# Patient Record
Sex: MALE | Race: WHITE | NOT HISPANIC OR LATINO | Employment: OTHER | URBAN - METROPOLITAN AREA
[De-identification: names, ages, dates, MRNs, and addresses within clinical notes are randomized per-mention and may not be internally consistent; named-entity substitution may affect disease eponyms.]

---

## 2017-11-20 ENCOUNTER — NON-PROVIDER VISIT (OUTPATIENT)
Dept: OCCUPATIONAL MEDICINE | Facility: CLINIC | Age: 72
End: 2017-11-20

## 2017-11-20 DIAGNOSIS — Z11.1 PPD SCREENING TEST: ICD-10-CM

## 2017-11-20 PROCEDURE — 86580 TB INTRADERMAL TEST: CPT | Performed by: PREVENTIVE MEDICINE

## 2017-11-22 ENCOUNTER — NON-PROVIDER VISIT (OUTPATIENT)
Dept: OCCUPATIONAL MEDICINE | Facility: CLINIC | Age: 72
End: 2017-11-22

## 2017-11-22 DIAGNOSIS — Z11.1 ENCOUNTER FOR PPD SKIN TEST READING: ICD-10-CM

## 2017-11-22 LAB — TB WHEAL 3D P 5 TU DIAM: NORMAL MM

## 2018-02-04 ENCOUNTER — APPOINTMENT (OUTPATIENT)
Dept: RADIOLOGY | Facility: MEDICAL CENTER | Age: 73
DRG: 312 | End: 2018-02-04
Attending: EMERGENCY MEDICINE
Payer: MEDICARE

## 2018-02-04 ENCOUNTER — HOSPITAL ENCOUNTER (INPATIENT)
Facility: MEDICAL CENTER | Age: 73
LOS: 4 days | DRG: 312 | End: 2018-02-08
Attending: EMERGENCY MEDICINE | Admitting: INTERNAL MEDICINE
Payer: MEDICARE

## 2018-02-04 ENCOUNTER — RESOLUTE PROFESSIONAL BILLING HOSPITAL PROF FEE (OUTPATIENT)
Dept: MEDSURG UNIT | Facility: MEDICAL CENTER | Age: 73
End: 2018-02-04
Payer: MEDICARE

## 2018-02-04 ENCOUNTER — APPOINTMENT (OUTPATIENT)
Dept: RADIOLOGY | Facility: MEDICAL CENTER | Age: 73
DRG: 312 | End: 2018-02-04
Attending: INTERNAL MEDICINE
Payer: MEDICARE

## 2018-02-04 DIAGNOSIS — E87.1 HYPONATREMIA: ICD-10-CM

## 2018-02-04 DIAGNOSIS — T14.8XXA MULTIPLE SKIN TEARS: ICD-10-CM

## 2018-02-04 DIAGNOSIS — S22.31XA CLOSED FRACTURE OF ONE RIB OF RIGHT SIDE, INITIAL ENCOUNTER: ICD-10-CM
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
## 2018-02-04 DIAGNOSIS — I95.1 ORTHOSTATIC SYNCOPE: ICD-10-CM

## 2018-02-04 PROBLEM — R91.8 LUNG MASS: Status: ACTIVE | Noted: 2018-02-04

## 2018-02-04 PROBLEM — D72.829 LEUKOCYTOSIS: Status: ACTIVE | Noted: 2018-02-04

## 2018-02-04 PROBLEM — R07.9 CHEST PAIN: Status: ACTIVE | Noted: 2018-02-04

## 2018-02-04 LAB
ALBUMIN SERPL BCP-MCNC: 3.1 G/DL (ref 3.2–4.9)
ALBUMIN/GLOB SERPL: 1.5 G/DL
ALP SERPL-CCNC: 68 U/L (ref 30–99)
ALT SERPL-CCNC: 27 U/L (ref 2–50)
AMPHET UR QL SCN: NEGATIVE
ANION GAP SERPL CALC-SCNC: 5 MMOL/L (ref 0–11.9)
APPEARANCE UR: CLEAR
APTT PPP: 28.1 SEC (ref 24.7–36)
AST SERPL-CCNC: 36 U/L (ref 12–45)
BARBITURATES UR QL SCN: NEGATIVE
BASOPHILS # BLD AUTO: 0 % (ref 0–1.8)
BASOPHILS # BLD: 0 K/UL (ref 0–0.12)
BENZODIAZ UR QL SCN: NEGATIVE
BILIRUB SERPL-MCNC: 0.5 MG/DL (ref 0.1–1.5)
BILIRUB UR QL STRIP.AUTO: NEGATIVE
BUN SERPL-MCNC: 7 MG/DL (ref 8–22)
BZE UR QL SCN: NEGATIVE
CALCIUM SERPL-MCNC: 8 MG/DL (ref 8.5–10.5)
CANNABINOIDS UR QL SCN: NEGATIVE
CHLORIDE SERPL-SCNC: 99 MMOL/L (ref 96–112)
CK SERPL-CCNC: 90 U/L (ref 0–154)
CO2 SERPL-SCNC: 23 MMOL/L (ref 20–33)
COLOR UR: YELLOW
CREAT SERPL-MCNC: 0.72 MG/DL (ref 0.5–1.4)
CREAT UR-MCNC: 32.8 MG/DL
EKG IMPRESSION: NORMAL
EOSINOPHIL # BLD AUTO: 0 K/UL (ref 0–0.51)
EOSINOPHIL NFR BLD: 0 % (ref 0–6.9)
ERYTHROCYTE [DISTWIDTH] IN BLOOD BY AUTOMATED COUNT: 43.6 FL (ref 35.9–50)
ETHANOL BLD-MCNC: 0.01 G/DL
GLOBULIN SER CALC-MCNC: 2.1 G/DL (ref 1.9–3.5)
GLUCOSE SERPL-MCNC: 82 MG/DL (ref 65–99)
GLUCOSE UR STRIP.AUTO-MCNC: NEGATIVE MG/DL
HCT VFR BLD AUTO: 36.5 % (ref 42–52)
HGB BLD-MCNC: 12.4 G/DL (ref 14–18)
INR PPP: 1.12 (ref 0.87–1.13)
KETONES UR STRIP.AUTO-MCNC: NEGATIVE MG/DL
LEUKOCYTE ESTERASE UR QL STRIP.AUTO: NEGATIVE
LYMPHOCYTES # BLD AUTO: 4.87 K/UL (ref 1–4.8)
LYMPHOCYTES NFR BLD: 31.6 % (ref 22–41)
MANUAL DIFF BLD: NORMAL
MCH RBC QN AUTO: 32.2 PG (ref 27–33)
MCHC RBC AUTO-ENTMCNC: 34 G/DL (ref 33.7–35.3)
MCV RBC AUTO: 94.8 FL (ref 81.4–97.8)
METHADONE UR QL SCN: NEGATIVE
MICRO URNS: NORMAL
MONOCYTES # BLD AUTO: 0.54 K/UL (ref 0–0.85)
MONOCYTES NFR BLD AUTO: 3.5 % (ref 0–13.4)
MORPHOLOGY BLD-IMP: NORMAL
NEUTROPHILS # BLD AUTO: 9.99 K/UL (ref 1.82–7.42)
NEUTROPHILS NFR BLD: 64 % (ref 44–72)
NEUTS BAND NFR BLD MANUAL: 0.9 % (ref 0–10)
NITRITE UR QL STRIP.AUTO: NEGATIVE
NRBC # BLD AUTO: 0 K/UL
NRBC BLD-RTO: 0 /100 WBC
OPIATES UR QL SCN: NEGATIVE
OXYCODONE UR QL SCN: POSITIVE
PCP UR QL SCN: NEGATIVE
PH UR STRIP.AUTO: 6.5 [PH]
PLATELET # BLD AUTO: 213 K/UL (ref 164–446)
PLATELET BLD QL SMEAR: NORMAL
PMV BLD AUTO: 8.9 FL (ref 9–12.9)
POTASSIUM SERPL-SCNC: 3.7 MMOL/L (ref 3.6–5.5)
POTASSIUM UR-SCNC: 6.3 MMOL/L
PROPOXYPH UR QL SCN: NEGATIVE
PROT SERPL-MCNC: 5.2 G/DL (ref 6–8.2)
PROT UR QL STRIP: NEGATIVE MG/DL
PROTHROMBIN TIME: 14.1 SEC (ref 12–14.6)
RBC # BLD AUTO: 3.85 M/UL (ref 4.7–6.1)
RBC BLD AUTO: PRESENT
RBC UR QL AUTO: NEGATIVE
SMUDGE CELLS BLD QL SMEAR: NORMAL
SODIUM SERPL-SCNC: 127 MMOL/L (ref 135–145)
SODIUM UR-SCNC: 23 MMOL/L
SP GR UR STRIP.AUTO: 1
TROPONIN I SERPL-MCNC: <0.01 NG/ML (ref 0–0.04)
TSH SERPL DL<=0.005 MIU/L-ACNC: 1.45 UIU/ML (ref 0.38–5.33)
UROBILINOGEN UR STRIP.AUTO-MCNC: 0.2 MG/DL
WBC # BLD AUTO: 15.4 K/UL (ref 4.8–10.8)

## 2018-02-04 PROCEDURE — 84133 ASSAY OF URINE POTASSIUM: CPT

## 2018-02-04 PROCEDURE — 85027 COMPLETE CBC AUTOMATED: CPT

## 2018-02-04 PROCEDURE — 93005 ELECTROCARDIOGRAM TRACING: CPT

## 2018-02-04 PROCEDURE — 94760 N-INVAS EAR/PLS OXIMETRY 1: CPT

## 2018-02-04 PROCEDURE — A9270 NON-COVERED ITEM OR SERVICE: HCPCS | Performed by: STUDENT IN AN ORGANIZED HEALTH CARE EDUCATION/TRAINING PROGRAM

## 2018-02-04 PROCEDURE — 96360 HYDRATION IV INFUSION INIT: CPT

## 2018-02-04 PROCEDURE — 85610 PROTHROMBIN TIME: CPT

## 2018-02-04 PROCEDURE — 82550 ASSAY OF CK (CPK): CPT

## 2018-02-04 PROCEDURE — 81003 URINALYSIS AUTO W/O SCOPE: CPT

## 2018-02-04 PROCEDURE — 93005 ELECTROCARDIOGRAM TRACING: CPT | Performed by: EMERGENCY MEDICINE

## 2018-02-04 PROCEDURE — 304217 HCHG IRRIGATION SYSTEM

## 2018-02-04 PROCEDURE — 85007 BL SMEAR W/DIFF WBC COUNT: CPT

## 2018-02-04 PROCEDURE — 85730 THROMBOPLASTIN TIME PARTIAL: CPT

## 2018-02-04 PROCEDURE — 82570 ASSAY OF URINE CREATININE: CPT

## 2018-02-04 PROCEDURE — 500002 HCHG ADHESIVE, DERMABOND: Performed by: EMERGENCY MEDICINE

## 2018-02-04 PROCEDURE — 83036 HEMOGLOBIN GLYCOSYLATED A1C: CPT

## 2018-02-04 PROCEDURE — 700105 HCHG RX REV CODE 258: Performed by: EMERGENCY MEDICINE

## 2018-02-04 PROCEDURE — 71045 X-RAY EXAM CHEST 1 VIEW: CPT

## 2018-02-04 PROCEDURE — 70450 CT HEAD/BRAIN W/O DYE: CPT

## 2018-02-04 PROCEDURE — 84484 ASSAY OF TROPONIN QUANT: CPT

## 2018-02-04 PROCEDURE — 80307 DRUG TEST PRSMV CHEM ANLYZR: CPT

## 2018-02-04 PROCEDURE — 700105 HCHG RX REV CODE 258: Performed by: STUDENT IN AN ORGANIZED HEALTH CARE EDUCATION/TRAINING PROGRAM

## 2018-02-04 PROCEDURE — 84443 ASSAY THYROID STIM HORMONE: CPT

## 2018-02-04 PROCEDURE — 80053 COMPREHEN METABOLIC PANEL: CPT

## 2018-02-04 PROCEDURE — 84300 ASSAY OF URINE SODIUM: CPT

## 2018-02-04 PROCEDURE — 700102 HCHG RX REV CODE 250 W/ 637 OVERRIDE(OP): Performed by: STUDENT IN AN ORGANIZED HEALTH CARE EDUCATION/TRAINING PROGRAM

## 2018-02-04 PROCEDURE — 770020 HCHG ROOM/CARE - TELE (206)

## 2018-02-04 PROCEDURE — 99285 EMERGENCY DEPT VISIT HI MDM: CPT

## 2018-02-04 PROCEDURE — 36415 COLL VENOUS BLD VENIPUNCTURE: CPT

## 2018-02-04 PROCEDURE — 71101 X-RAY EXAM UNILAT RIBS/CHEST: CPT | Mod: RT

## 2018-02-04 RX ORDER — DONEPEZIL HYDROCHLORIDE 10 MG/1
10 TABLET, FILM COATED ORAL NIGHTLY
Status: ON HOLD | COMMUNITY
End: 2018-02-07

## 2018-02-04 RX ORDER — OXYCODONE AND ACETAMINOPHEN 10; 325 MG/1; MG/1
1 TABLET ORAL 2 TIMES DAILY
COMMUNITY
End: 2021-07-22 | Stop reason: SDUPTHER

## 2018-02-04 RX ORDER — PAROXETINE HYDROCHLORIDE 20 MG/1
20 TABLET, FILM COATED ORAL DAILY
COMMUNITY
End: 2022-01-17

## 2018-02-04 RX ORDER — ONDANSETRON 4 MG/1
4 TABLET, ORALLY DISINTEGRATING ORAL EVERY 4 HOURS PRN
Status: DISCONTINUED | OUTPATIENT
Start: 2018-02-04 | End: 2018-02-08 | Stop reason: HOSPADM

## 2018-02-04 RX ORDER — BISACODYL 10 MG
10 SUPPOSITORY, RECTAL RECTAL
Status: DISCONTINUED | OUTPATIENT
Start: 2018-02-04 | End: 2018-02-08 | Stop reason: HOSPADM

## 2018-02-04 RX ORDER — TIOTROPIUM BROMIDE 18 UG/1
1 CAPSULE ORAL; RESPIRATORY (INHALATION) DAILY
Status: DISCONTINUED | OUTPATIENT
Start: 2018-02-05 | End: 2018-02-08 | Stop reason: HOSPADM

## 2018-02-04 RX ORDER — POLYETHYLENE GLYCOL 3350 17 G/17G
1 POWDER, FOR SOLUTION ORAL
Status: DISCONTINUED | OUTPATIENT
Start: 2018-02-04 | End: 2018-02-08 | Stop reason: HOSPADM

## 2018-02-04 RX ORDER — ONDANSETRON 2 MG/ML
4 INJECTION INTRAMUSCULAR; INTRAVENOUS EVERY 4 HOURS PRN
Status: DISCONTINUED | OUTPATIENT
Start: 2018-02-04 | End: 2018-02-08 | Stop reason: HOSPADM

## 2018-02-04 RX ORDER — ACETAMINOPHEN 325 MG/1
650 TABLET ORAL EVERY 6 HOURS PRN
Status: DISCONTINUED | OUTPATIENT
Start: 2018-02-04 | End: 2018-02-08 | Stop reason: HOSPADM

## 2018-02-04 RX ORDER — AMOXICILLIN 250 MG
2 CAPSULE ORAL 2 TIMES DAILY
Status: DISCONTINUED | OUTPATIENT
Start: 2018-02-04 | End: 2018-02-08 | Stop reason: HOSPADM

## 2018-02-04 RX ORDER — NICOTINE 21 MG/24HR
21 PATCH, TRANSDERMAL 24 HOURS TRANSDERMAL
Status: DISCONTINUED | OUTPATIENT
Start: 2018-02-05 | End: 2018-02-08 | Stop reason: HOSPADM

## 2018-02-04 RX ORDER — DONEPEZIL HYDROCHLORIDE 5 MG/1
10 TABLET, FILM COATED ORAL NIGHTLY
Status: DISCONTINUED | OUTPATIENT
Start: 2018-02-04 | End: 2018-02-06

## 2018-02-04 RX ORDER — LABETALOL HYDROCHLORIDE 5 MG/ML
10 INJECTION, SOLUTION INTRAVENOUS EVERY 4 HOURS PRN
Status: DISCONTINUED | OUTPATIENT
Start: 2018-02-04 | End: 2018-02-08 | Stop reason: HOSPADM

## 2018-02-04 RX ORDER — SODIUM CHLORIDE 9 MG/ML
INJECTION, SOLUTION INTRAVENOUS CONTINUOUS
Status: DISCONTINUED | OUTPATIENT
Start: 2018-02-04 | End: 2018-02-07

## 2018-02-04 RX ORDER — DOCUSATE SODIUM 100 MG/1
200 CAPSULE, LIQUID FILLED ORAL DAILY
COMMUNITY
End: 2022-01-17

## 2018-02-04 RX ORDER — SODIUM CHLORIDE 9 MG/ML
1000 INJECTION, SOLUTION INTRAVENOUS ONCE
Status: COMPLETED | OUTPATIENT
Start: 2018-02-04 | End: 2018-02-04

## 2018-02-04 RX ORDER — ERYTHROMYCIN 5 MG/G
OINTMENT OPHTHALMIC NIGHTLY
COMMUNITY
End: 2022-02-23

## 2018-02-04 RX ADMIN — SODIUM CHLORIDE: 9 INJECTION, SOLUTION INTRAVENOUS at 23:53

## 2018-02-04 RX ADMIN — DONEPEZIL HYDROCHLORIDE 10 MG: 5 TABLET, FILM COATED ORAL at 23:52

## 2018-02-04 RX ADMIN — SODIUM CHLORIDE 1000 ML: 9 INJECTION, SOLUTION INTRAVENOUS at 20:28

## 2018-02-04 ASSESSMENT — ENCOUNTER SYMPTOMS
CLAUDICATION: 0
CHILLS: 0
TREMORS: 0
EYE DISCHARGE: 0
EYE REDNESS: 0
HEARTBURN: 0
BLOOD IN STOOL: 0
BLURRED VISION: 0
DEPRESSION: 0
EYE PAIN: 0
HALLUCINATIONS: 0
SINUS PAIN: 0
STRIDOR: 0
HEADACHES: 0
ORTHOPNEA: 0
DIAPHORESIS: 0
COUGH: 1
DIARRHEA: 0
DIZZINESS: 1
CONSTIPATION: 0
SPUTUM PRODUCTION: 0
HEMOPTYSIS: 0
FLANK PAIN: 0
BACK PAIN: 0
ABDOMINAL PAIN: 0
DOUBLE VISION: 0
WEIGHT LOSS: 0
VOMITING: 0
INSOMNIA: 0
FOCAL WEAKNESS: 0
SEIZURES: 0
PHOTOPHOBIA: 0
WHEEZING: 0
NAUSEA: 0
TINGLING: 0
NECK PAIN: 0
NERVOUS/ANXIOUS: 0
PND: 0
PALPITATIONS: 0
FALLS: 1
SORE THROAT: 0
MYALGIAS: 0
FEVER: 0
LOSS OF CONSCIOUSNESS: 0
SHORTNESS OF BREATH: 0
SPEECH CHANGE: 0
BRUISES/BLEEDS EASILY: 0
SENSORY CHANGE: 0
MEMORY LOSS: 0
POLYDIPSIA: 0
WEAKNESS: 0

## 2018-02-04 ASSESSMENT — LIFESTYLE VARIABLES
AVERAGE NUMBER OF DAYS PER WEEK YOU HAVE A DRINK CONTAINING ALCOHOL: 0
EVER_SMOKED: YES
EVER_SMOKED: YES
EVER FELT BAD OR GUILTY ABOUT YOUR DRINKING: NO
TOTAL SCORE: 0
EVER HAD A DRINK FIRST THING IN THE MORNING TO STEADY YOUR NERVES TO GET RID OF A HANGOVER: NO
TOTAL SCORE: 0
ON A TYPICAL DAY WHEN YOU DRINK ALCOHOL HOW MANY DRINKS DO YOU HAVE: 0
HAVE YOU EVER FELT YOU SHOULD CUT DOWN ON YOUR DRINKING: NO
CONSUMPTION TOTAL: NEGATIVE
HAVE PEOPLE ANNOYED YOU BY CRITICIZING YOUR DRINKING: NO
TOTAL SCORE: 0
SUBSTANCE_ABUSE: 0
ALCOHOL_USE: YES
HOW MANY TIMES IN THE PAST YEAR HAVE YOU HAD 5 OR MORE DRINKS IN A DAY: 0

## 2018-02-04 ASSESSMENT — COGNITIVE AND FUNCTIONAL STATUS - GENERAL
DRESSING REGULAR UPPER BODY CLOTHING: A LITTLE
CLIMB 3 TO 5 STEPS WITH RAILING: A LITTLE
SUGGESTED CMS G CODE MODIFIER MOBILITY: CJ
MOVING FROM LYING ON BACK TO SITTING ON SIDE OF FLAT BED: A LITTLE
DRESSING REGULAR LOWER BODY CLOTHING: A LITTLE
MOBILITY SCORE: 20
HELP NEEDED FOR BATHING: A LITTLE
SUGGESTED CMS G CODE MODIFIER DAILY ACTIVITY: CJ
DAILY ACTIVITIY SCORE: 21
WALKING IN HOSPITAL ROOM: A LITTLE
STANDING UP FROM CHAIR USING ARMS: A LITTLE

## 2018-02-04 ASSESSMENT — PAIN SCALES - GENERAL
PAINLEVEL_OUTOF10: 3
PAINLEVEL_OUTOF10: 7

## 2018-02-04 ASSESSMENT — PATIENT HEALTH QUESTIONNAIRE - PHQ9
SUM OF ALL RESPONSES TO PHQ9 QUESTIONS 1 AND 2: 0
SUM OF ALL RESPONSES TO PHQ QUESTIONS 1-9: 0
2. FEELING DOWN, DEPRESSED, IRRITABLE, OR HOPELESS: NOT AT ALL
1. LITTLE INTEREST OR PLEASURE IN DOING THINGS: NOT AT ALL

## 2018-02-05 PROBLEM — R00.1 BRADYCARDIA: Status: ACTIVE | Noted: 2018-02-05

## 2018-02-05 PROBLEM — Z86.718 HISTORY OF DEEP VEIN THROMBOSIS (DVT) OF LOWER EXTREMITY: Status: ACTIVE | Noted: 2018-02-05

## 2018-02-05 LAB
ALBUMIN SERPL BCP-MCNC: 3.3 G/DL (ref 3.2–4.9)
ALBUMIN/GLOB SERPL: 2.1 G/DL
ALP SERPL-CCNC: 60 U/L (ref 30–99)
ALT SERPL-CCNC: 21 U/L (ref 2–50)
ANION GAP SERPL CALC-SCNC: 4 MMOL/L (ref 0–11.9)
AST SERPL-CCNC: 27 U/L (ref 12–45)
BASOPHILS # BLD AUTO: 0 % (ref 0–1.8)
BASOPHILS # BLD: 0 K/UL (ref 0–0.12)
BILIRUB SERPL-MCNC: 0.5 MG/DL (ref 0.1–1.5)
BUN SERPL-MCNC: 7 MG/DL (ref 8–22)
CALCIUM SERPL-MCNC: 8.3 MG/DL (ref 8.5–10.5)
CHLORIDE SERPL-SCNC: 108 MMOL/L (ref 96–112)
CO2 SERPL-SCNC: 22 MMOL/L (ref 20–33)
CREAT SERPL-MCNC: 0.56 MG/DL (ref 0.5–1.4)
DEPRECATED D DIMER PPP IA-ACNC: 567 NG/ML(D-DU)
EOSINOPHIL # BLD AUTO: 0 K/UL (ref 0–0.51)
EOSINOPHIL NFR BLD: 0 % (ref 0–6.9)
ERYTHROCYTE [DISTWIDTH] IN BLOOD BY AUTOMATED COUNT: 43.8 FL (ref 35.9–50)
EST. AVERAGE GLUCOSE BLD GHB EST-MCNC: 117 MG/DL
GLOBULIN SER CALC-MCNC: 1.6 G/DL (ref 1.9–3.5)
GLUCOSE SERPL-MCNC: 100 MG/DL (ref 65–99)
HBA1C MFR BLD: 5.7 % (ref 0–5.6)
HCT VFR BLD AUTO: 36 % (ref 42–52)
HGB BLD-MCNC: 12.1 G/DL (ref 14–18)
LV EJECT FRACT  99904: 55
LV EJECT FRACT MOD 2C 99903: 75.9
LV EJECT FRACT MOD 4C 99902: 60.68
LV EJECT FRACT MOD BP 99901: 69.83
LYMPHOCYTES # BLD AUTO: 7.01 K/UL (ref 1–4.8)
LYMPHOCYTES NFR BLD: 56.5 % (ref 22–41)
MANUAL DIFF BLD: NORMAL
MCH RBC QN AUTO: 31.9 PG (ref 27–33)
MCHC RBC AUTO-ENTMCNC: 33.6 G/DL (ref 33.7–35.3)
MCV RBC AUTO: 95 FL (ref 81.4–97.8)
MONOCYTES # BLD AUTO: 0.32 K/UL (ref 0–0.85)
MONOCYTES NFR BLD AUTO: 2.6 % (ref 0–13.4)
MORPHOLOGY BLD-IMP: NORMAL
NEUTROPHILS # BLD AUTO: 5.07 K/UL (ref 1.82–7.42)
NEUTROPHILS NFR BLD: 39.1 % (ref 44–72)
NEUTS BAND NFR BLD MANUAL: 1.8 % (ref 0–10)
NRBC # BLD AUTO: 0.03 K/UL
NRBC BLD-RTO: 0.2 /100 WBC
PLATELET # BLD AUTO: 216 K/UL (ref 164–446)
PLATELET BLD QL SMEAR: NORMAL
PMV BLD AUTO: 9 FL (ref 9–12.9)
POTASSIUM SERPL-SCNC: 3.9 MMOL/L (ref 3.6–5.5)
PROCALCITONIN SERPL-MCNC: <0.05 NG/ML
PROT SERPL-MCNC: 4.9 G/DL (ref 6–8.2)
RBC # BLD AUTO: 3.79 M/UL (ref 4.7–6.1)
RBC BLD AUTO: PRESENT
SMUDGE CELLS BLD QL SMEAR: NORMAL
SODIUM SERPL-SCNC: 134 MMOL/L (ref 135–145)
WBC # BLD AUTO: 12.4 K/UL (ref 4.8–10.8)

## 2018-02-05 PROCEDURE — 93306 TTE W/DOPPLER COMPLETE: CPT | Mod: 26 | Performed by: INTERNAL MEDICINE

## 2018-02-05 PROCEDURE — 85007 BL SMEAR W/DIFF WBC COUNT: CPT

## 2018-02-05 PROCEDURE — 700101 HCHG RX REV CODE 250: Performed by: STUDENT IN AN ORGANIZED HEALTH CARE EDUCATION/TRAINING PROGRAM

## 2018-02-05 PROCEDURE — 99223 1ST HOSP IP/OBS HIGH 75: CPT | Mod: AI,GC | Performed by: INTERNAL MEDICINE

## 2018-02-05 PROCEDURE — 700111 HCHG RX REV CODE 636 W/ 250 OVERRIDE (IP): Performed by: STUDENT IN AN ORGANIZED HEALTH CARE EDUCATION/TRAINING PROGRAM

## 2018-02-05 PROCEDURE — 93306 TTE W/DOPPLER COMPLETE: CPT

## 2018-02-05 PROCEDURE — 84145 PROCALCITONIN (PCT): CPT

## 2018-02-05 PROCEDURE — 80053 COMPREHEN METABOLIC PANEL: CPT

## 2018-02-05 PROCEDURE — 770020 HCHG ROOM/CARE - TELE (206)

## 2018-02-05 PROCEDURE — 85379 FIBRIN DEGRADATION QUANT: CPT

## 2018-02-05 PROCEDURE — 85027 COMPLETE CBC AUTOMATED: CPT

## 2018-02-05 PROCEDURE — 700105 HCHG RX REV CODE 258: Performed by: STUDENT IN AN ORGANIZED HEALTH CARE EDUCATION/TRAINING PROGRAM

## 2018-02-05 PROCEDURE — A9270 NON-COVERED ITEM OR SERVICE: HCPCS | Performed by: STUDENT IN AN ORGANIZED HEALTH CARE EDUCATION/TRAINING PROGRAM

## 2018-02-05 PROCEDURE — 700102 HCHG RX REV CODE 250 W/ 637 OVERRIDE(OP): Performed by: STUDENT IN AN ORGANIZED HEALTH CARE EDUCATION/TRAINING PROGRAM

## 2018-02-05 PROCEDURE — 99222 1ST HOSP IP/OBS MODERATE 55: CPT | Performed by: INTERNAL MEDICINE

## 2018-02-05 PROCEDURE — 99406 BEHAV CHNG SMOKING 3-10 MIN: CPT

## 2018-02-05 PROCEDURE — 36415 COLL VENOUS BLD VENIPUNCTURE: CPT

## 2018-02-05 RX ORDER — TRAMADOL HYDROCHLORIDE 50 MG/1
50 TABLET ORAL ONCE
Status: COMPLETED | OUTPATIENT
Start: 2018-02-05 | End: 2018-02-05

## 2018-02-05 RX ADMIN — ACETAMINOPHEN 650 MG: 325 TABLET, FILM COATED ORAL at 06:45

## 2018-02-05 RX ADMIN — TIOTROPIUM BROMIDE 1 CAPSULE: 18 CAPSULE ORAL; RESPIRATORY (INHALATION) at 08:32

## 2018-02-05 RX ADMIN — SODIUM CHLORIDE: 9 INJECTION, SOLUTION INTRAVENOUS at 09:40

## 2018-02-05 RX ADMIN — DONEPEZIL HYDROCHLORIDE 10 MG: 5 TABLET, FILM COATED ORAL at 19:52

## 2018-02-05 RX ADMIN — ACETAMINOPHEN 650 MG: 325 TABLET, FILM COATED ORAL at 00:14

## 2018-02-05 RX ADMIN — FOLIC ACID: 5 INJECTION, SOLUTION INTRAMUSCULAR; INTRAVENOUS; SUBCUTANEOUS at 02:40

## 2018-02-05 RX ADMIN — ENOXAPARIN SODIUM 40 MG: 100 INJECTION SUBCUTANEOUS at 08:32

## 2018-02-05 RX ADMIN — TRAMADOL HYDROCHLORIDE 50 MG: 50 TABLET, COATED ORAL at 19:52

## 2018-02-05 RX ADMIN — NICOTINE 21 MG: 21 PATCH, EXTENDED RELEASE TRANSDERMAL at 04:59

## 2018-02-05 RX ADMIN — ACETAMINOPHEN 650 MG: 325 TABLET, FILM COATED ORAL at 23:21

## 2018-02-05 RX ADMIN — ACETAMINOPHEN 650 MG: 325 TABLET, FILM COATED ORAL at 16:34

## 2018-02-05 RX ADMIN — SODIUM CHLORIDE: 9 INJECTION, SOLUTION INTRAVENOUS at 19:31

## 2018-02-05 RX ADMIN — STANDARDIZED SENNA CONCENTRATE AND DOCUSATE SODIUM 2 TABLET: 8.6; 5 TABLET, FILM COATED ORAL at 19:52

## 2018-02-05 ASSESSMENT — PAIN SCALES - GENERAL
PAINLEVEL_OUTOF10: 10
PAINLEVEL_OUTOF10: 5
PAINLEVEL_OUTOF10: 8
PAINLEVEL_OUTOF10: 2
PAINLEVEL_OUTOF10: 0
PAINLEVEL_OUTOF10: 5
PAINLEVEL_OUTOF10: 3
PAINLEVEL_OUTOF10: 9
PAINLEVEL_OUTOF10: 0

## 2018-02-05 ASSESSMENT — ENCOUNTER SYMPTOMS
BLOOD IN STOOL: 0
DIARRHEA: 0
POLYDIPSIA: 0
ORTHOPNEA: 0
PALPITATIONS: 0
COUGH: 0
BLURRED VISION: 0
CONSTIPATION: 0
VOMITING: 0
FEVER: 0
DOUBLE VISION: 0
WHEEZING: 0
WEAKNESS: 1
FLANK PAIN: 0
SENSORY CHANGE: 0
FOCAL WEAKNESS: 0
NERVOUS/ANXIOUS: 0
SHORTNESS OF BREATH: 0
CHILLS: 0
HEARTBURN: 0
LOSS OF CONSCIOUSNESS: 0
HEADACHES: 0
SPEECH CHANGE: 0
ABDOMINAL PAIN: 0
NAUSEA: 0
DEPRESSION: 0
SORE THROAT: 0
SPUTUM PRODUCTION: 0
MYALGIAS: 0

## 2018-02-05 NOTE — PROGRESS NOTES
Internal Medicine Interval Note  Note Author: ZAINAB Hong.     Name Jose Armando Wu     1945   Age/Sex 72 y.o. male   MRN [id number]   Code Status FULL     After 5PM or if no immediate response to page, please call for cross-coverage  Attending/Team:  See Patient List for primary contact information  Call [phone] to page    1st Call - Day Intern (R1):   Sarah Vazquez   2nd Call - Day Sr. Resident (R2/R3):             Reason for interval visit  (Principal Problem)   Fall from ground level    Interval Problem Daily Status Update  (24 hours)   -Patient is admitted last night after a Ground level fall with no prodromal symptoms.  -Patient has ongoing symptoms of chest wall pain right sided since the fall.  -ECHO is pending.  -On telemetry monitoring.  -Denies Dizziness at rest, has Symptoms of lightheadedness on standing from change in the posture.    Review of Systems   Constitutional: Negative for chills and fever.   HENT: Negative for ear discharge, nosebleeds and sore throat.    Eyes: Negative for blurred vision and double vision.   Respiratory: Negative for cough, sputum production, shortness of breath and wheezing.    Cardiovascular: Positive for chest pain. Negative for palpitations, orthopnea and leg swelling.   Gastrointestinal: Negative for abdominal pain, blood in stool, constipation, diarrhea, heartburn, nausea and vomiting.   Genitourinary: Negative for dysuria, flank pain, frequency, hematuria and urgency.   Musculoskeletal: Positive for joint pain. Negative for myalgias.   Skin: Negative for rash.   Neurological: Positive for weakness. Negative for sensory change, speech change, focal weakness, loss of consciousness and headaches.   Endo/Heme/Allergies: Negative for polydipsia.   Psychiatric/Behavioral: Negative for depression. The patient is not nervous/anxious.        Consultants/Specialty  geriatrics    Disposition  Inpatient management for further  evaluation, Plan to discharge tomorrow     Quality Measures    Reviewed items::  EKG reviewed, Radiology images reviewed, Labs reviewed and Medications reviewed  Skelton catheter::  No Skelton  DVT prophylaxis pharmacological::  Enoxaparin (Lovenox)  Ulcer Prophylaxis::  Not indicated      Physical Exam       Vitals:    02/05/18 0333 02/05/18 0752 02/05/18 1136 02/05/18 1513   BP: (!) 98/69 110/74 103/70 116/77   Pulse: 66 82 80 93   Resp: 18 20 18 18   Temp: 36.9 °C (98.4 °F) 36.9 °C (98.5 °F) 37.1 °C (98.7 °F) 37.2 °C (99 °F)   SpO2: 94% 97% 95% 94%   Weight:       Height:         Body mass index is 16.86 kg/m². Weight: 56.4 kg (124 lb 5.4 oz)  Oxygen Therapy:  Pulse Oximetry: 94 %, O2 (LPM): 0, O2 Delivery: None (Room Air)    Physical Exam   Constitutional: He appears malnourished. No distress.   HENT:   Mouth/Throat: Oropharynx is clear and moist.   Eyes: Conjunctivae and EOM are normal. Pupils are equal, round, and reactive to light. No scleral icterus.   Neck: Normal range of motion. Neck supple. No JVD present.   Cardiovascular: Normal rate and regular rhythm.    Murmur heard.  Pulmonary/Chest: Effort normal and breath sounds normal. No respiratory distress. He has no wheezes. He has no rales. He exhibits tenderness.   Abdominal: Soft. Bowel sounds are normal. He exhibits no distension. There is no tenderness. There is no rebound and no guarding.   Musculoskeletal: Normal range of motion. He exhibits no edema.   Neurological: He is alert. He has normal sensation and normal strength. He is not disoriented. He displays facial symmetry. No cranial nerve deficit.   Skin: Skin is warm and dry. No rash noted. He is not diaphoretic. No erythema. No pallor.         Lab Data Review:         2/5/2018  2:55 PM    Recent Labs      02/04/18 1951 02/05/18   0327   SODIUM  127*  134*   POTASSIUM  3.7  3.9   CHLORIDE  99  108   CO2  23  22   BUN  7*  7*   CREATININE  0.72  0.56   CALCIUM  8.0*  8.3*       Recent Labs       02/04/18 1951 02/05/18   0327   ALTSGPT  27  21   ASTSGOT  36  27   ALKPHOSPHAT  68  60   TBILIRUBIN  0.5  0.5   GLUCOSE  82  100*       Recent Labs      02/04/18 1951 02/05/18   0327   RBC  3.85*  3.79*   HEMOGLOBIN  12.4*  12.1*   HEMATOCRIT  36.5*  36.0*   PLATELETCT  213  216   PROTHROMBTM  14.1   --    APTT  28.1   --    INR  1.12   --        Recent Labs      02/04/18 1951 02/05/18   0327   WBC  15.4*  12.4*   NEUTSPOLYS  64.00  39.10*   LYMPHOCYTES  31.60  56.50*   MONOCYTES  3.50  2.60   EOSINOPHILS  0.00  0.00   BASOPHILS  0.00  0.00   ASTSGOT  36  27   ALTSGPT  27  21   ALKPHOSPHAT  68  60   TBILIRUBIN  0.5  0.5           Assessment/Plan     * Fall from ground level- (present on admission)   Assessment & Plan    -72 years old male presented after a unwitnessed GLF on admission with symptoms of dizziness,lightheadedness prior to the fall. Denies any prodromal symptoms of diaphoresis,palpitations  -No history of previous falls.  -Patient ambulates with a cane when required to use a walker per guardian.  -admitted to telemetry.  -ECHO is pending.Previous Echo in 2016 showed LVEF 60-65%  -D dimer is ordered to rule out Pulmonary embolism  -IVF @ 100 ml/Hr  -Differential diagnosis : Polypharmacy, cardiac etiology, Orthostatic hypotension,pulmonary embolism,Metastasis of primary lung cancer, chronic pain in bilateral hips s/p replacement.  -Consulted geriatrics.Appreciate Rec's.                    Lung mass- (present on admission)   Assessment & Plan    -Previous imaging studies showed 5 x 11.7 mm spiculated nodule right lung apex similar to findings in 3/26/2015  -Scattered atelectasis/fibrosis and mild mediastinal,hilar adenopathy.  -Follow up with pulmonary for biopsy or resection of the mass Is done.  -Patient refused any interventions of the mass.             Hyponatremia- (present on admission)   Assessment & Plan    -On admission CMP showed Na of 127,Repeat after IVF is 134. Likely secondary to  dehydration  -History of Lung mass 5.3 mm spiculated mass in the right apex   Plan:   - Will continue to monitor            Chest pain- (present on admission)   Assessment & Plan    -Patient has symptoms of chest pain located on the right side   -Ribs xr showed possible subtle anterior lateral ninth rib fracture   Plan:   - PRN tylenol for pain.          Leukocytosis- (present on admission)   Assessment & Plan    -WBC on admission is 15.4, repeat after NS 1000 ml is 12.4   -likely secondary to dehydration  -no source of infection and vitals are stable.  -Will Continue to monitor

## 2018-02-05 NOTE — SENIOR ADMIT NOTE
Senior Admit Note    Jose Armando Wu 72 y.o. male with PMHx of stroke, anemia, arthritis presented to the hospital after he had ground level fall while he was watching super bowl. He lives at assisted living facility and he was sitting at the edge of the bed and then he stood up and felt light headed and fall but he didn't loose his consciousness and denies trauma to head. He takes pain medication include gabapentin 3 capsue X twice a day but he denies taking Norco although its listed in outpatient medication list. He smokes 10 cig/ day and he has been smoking since he was 16. He has been living in assisted living facility for the last 1 and half year. He denies use of alcohol and per patient his last drink was several years ago. He does not think that his medication is causing this problem. He denies current use of marijuana and other drug use. At the time of evaluation he does not have any symptoms except right side rib pain. He denies diarrhea, vomiting and any other symptoms.     Physical Exam:    General: Not in acute distress  HEENT: NCAT, Blood on left side of face  Resp: Diffuse mild wheeze B/L  CVS: Regular rate and rhythm   Abdomen: Soft non tender +BS, tender on right side of chest  Neuro: No focal deficit, CN II-XII grossly intact      Past Medical History:   Diagnosis Date   • Alcohol abuse    • Anemia    • Arthritis    • COPD (chronic obstructive pulmonary disease)    • GERD (gastroesophageal reflux disease)    • Stroke    • Tobacco abuse        DX-CHEST-LIMITED (1 VIEW)   Final Result         1.  No acute cardiopulmonary disease.   2.  Atherosclerosis      [id number] (WITH 1-VIEW CXR) RIGHT   Final Result         1.  Possible subtle anterior lateral ninth rib fracture.   2.  Atherosclerosis      CT-HEAD W/O   Final Result         1.  No acute intracranial abnormality is identified, there are changes of small vessel ischemic disease with mild atrophy noted.   2.  Atherosclerosis.       ECHOCARDIOGRAM COMP W/O CONT    (Results Pending)         Assessment and plan:    72 y.o. male presented to the hospital with complaint of ground level fall. Patient reported that he noticed lightheadedness and after that he had fall. He underwent CT head it didn't show any acute abnormalities and his CXR showed possible subtle anterior ninth rib fracture. He reported that he does not drink alcohol but his BAL found to be 0.1. Started him on rally bag.     For evaluation of fall we will order echocardiogram as his last echo was in 2015. His fall could be due to vasovagal or due to drinking. Although he declined it. Also he has been using gabapentin and also his UA is positive with oxycodone. Polypharmacy could be also the reason of his lightheadedness.     For Hyponatremia (127) will ordered urine lytes and urine osm. He has hyponatremia in 2015 and sodium was 131 in our system.       DVT prophylaxis: Lovenox SQ    Code status: Full

## 2018-02-05 NOTE — ASSESSMENT & PLAN NOTE
-WBC on admission is 15.4, differential abnormal with smudge cells and basophils, PS suggest large platelets. There's mild anemia appropriate for his age, no thrombocytopenia. 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   - Given patient's overall prognosis, wish for non aggressive measure and indolent nature of CLL, no further evaluation recommended as this time.   - monitor with outpatient CBC and referral to hematology if he develops significant thrombocytopenia, symptomatic anemia. Rediscuss with guardian for goals of care.

## 2018-02-05 NOTE — ED NOTES
Pt's BP dropped (prior to transport) to 70/41; when pt is placed in trendelenburg, BP lowers, when pt is sat up or stands up BP raises. Pt nonsymptomatic unless standing, at which point he c/o dizziness.

## 2018-02-05 NOTE — PROGRESS NOTES
2RN skin check w/ [place], RN    Pt w/ discolored, brown spot below left eye   Scabbing, and dried blood to upper lip  Right arm w/ skin tears X3 - upper, mid, and lower arm  Buttock Red, blanching - skin tear 3cm to left buttock  (wound photos uploaded)  Feet dry, calloused bilat

## 2018-02-05 NOTE — PROGRESS NOTES
Catrachito Ramírez consulted re: pt passing swallow eval - order reg diet soft.  MD does want to give rally bag and no narcotics only tylenol for pain at this time.

## 2018-02-05 NOTE — ASSESSMENT & PLAN NOTE
-Patient has symptoms of chest pain located on the right side   -Ribs xr showed possible subtle anterior lateral ninth rib fracture   Plan:   - home Percocet bid. Hold further dose increase due to increase risk for fall. PRN tylenol for pain if needed. Patient is agreeable with the plan.

## 2018-02-05 NOTE — CARE PLAN
Problem: Safety  Goal: Will remain free from falls  Educated pt on the need to call before attempting to ambulate, bed alarm engaged, bed in lowest/locked position    Problem: Skin Integrity  Goal: Risk for impaired skin integrity will decrease  Educated pt on current skin condition and ways we can prevent further breakdown

## 2018-02-05 NOTE — PROGRESS NOTES
Bedside report received, assumed pt care @9755. Pt A&Ox4, but very forgetful. He c/o pain in ribs; declines any intervention at this time. POC discussed, he verbalized understanding. Pt unsteady on his feet. Call light within reach

## 2018-02-05 NOTE — ED NOTES
Med rec complete per pt list from caregiver in pt chart   Allergies reviewed  No oral ABX per list   Care Giver: [phone] (MAURICIO)
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
## 2018-02-05 NOTE — DIETARY
"Nutrition Services: Consult for Low BMI    Past Medical History:   Diagnosis Date   • Alcohol abuse    • Anemia    • Arthritis    • COPD (chronic obstructive pulmonary disease)    • GERD (gastroesophageal reflux disease)    • Stroke    • Tobacco abuse      Ht: 6' 0\"  Wt: 56.4kg (124#) via bed scale  BMI: 16.7 (underweight classification)  Diet: Regular/ Dysphagia 3 (mechanical soft), Thin Liquids    71 y/o male admitted r/t fall from ground level seen today at bedside to assess nutrition status. Noted per chart review that the patient has varying weights since admit- weighing 68.9 kg (152#) on admission and 56.4 kg (124#) hours later, which is likely r/t input error. After visually assessing pt at bedside, the latter lower weight of 124# is likely more accurate as the patient appears very thin and frail. Pt reports that he eats well at home and has a good appetite at this time. Pt denied any recent weight changes, but per chart review the pt weighed 142# 2 years ago, indicating the patient has experienced 18#(12.6%) weight loss in an unknown specific time frame. Pt is agreeable to snacks and reports he drinks supplements at home. Added snacks TID between meals and spoke with RN regarding supplements and RN agreed to add order of Boost Plus TID. The pt did not express any further questions or concerns at this time.     Pertinent Labs: Na: 134, Glucose: 100, BUN: 7   Pertinent Meds: Reviewed  Fluids: NS @ 100 mL/hr  Skin: Skin tears to L sacrum, RU arm, and RL arm per nursing flowsheet  GI: Last BM 2/2      PLAN/RECOMMEND -   1) Nutrition rep to see patient daily for meal and snack preferences.  2) Encourage PO of meals, snacks, and supplements  3) Weekly weights to monitor fluid and nutrition status  4) Continual documentation of PO intake as % of meals/ snacks/ supplements consumed to better assess PO adequacy    RD following    "

## 2018-02-05 NOTE — ED PROVIDER NOTES
"ED Provider Note    Scribed for Shree Miner M.D. by Greta Hernández. 2/4/2018, 7:33 PM.    Primary care provider: SHERLEY Santos  Means of arrival: EMS  History obtained from: Patient  History limited by: None    CHIEF COMPLAINT  Chief Complaint   Patient presents with   • Fall     stood up from seated position and got dizzy   • Arm Pain     skin tear from R forearm       HPI  Jose Armando Wu is a 72 y.o. male who presents to the Emergency Department for evaluation of arm pain post fall onset prior to his arrival. Per patient, he got up from a seated position and began feeling \"light-headed.\" He reports falling and hitting his head and right arm on his heater. He is currently complaining of right arm pain and lip pain. The patient denies any face pain, vomiting, diarrhea, chest pain, or headache. He reports that he has not experiencing light-headedness in the past. The patient denies using alcohol today. He is not currently on any blood thinners. His tetanus is up to date. Per patient, he takes Norco daily.    REVIEW OF SYSTEMS  Pertinent positives include fall, right arm pain, loss of consciousness. Pertinent negatives include vomiting, diarrhea, chest pain, headache, face pain. All other systems negative.  C.    PAST MEDICAL HISTORY   has a past medical history of Alcohol abuse; Anemia; Arthritis; COPD (chronic obstructive pulmonary disease); GERD (gastroesophageal reflux disease); Stroke; and Tobacco abuse.    SURGICAL HISTORY   has a past surgical history that includes other orthopedic surgery and hip nailing intramedullary (8/16/2013).    SOCIAL HISTORY  Social History   Substance Use Topics   • Smoking status: Current Every Day Smoker     Packs/day: 0.50     Years: 50.00   • Alcohol use Yes      Comment: +ETOH abuse      History   Drug use: Unknown       FAMILY HISTORY  No family history noted    CURRENT MEDICATIONS  No current facility-administered medications on file prior to " encounter.      Current Outpatient Prescriptions on File Prior to Encounter   Medication Sig Dispense Refill   • tiotropium (SPIRIVA) 18 MCG CAPS Inhale 18 mcg by mouth every day.     • cyanocobalamin (VITAMIN B-12) 100 MCG TABS Take 100 mcg by mouth every day.     • gabapentin (NEURONTIN) 300 MG CAPS Take 900 mg by mouth 3 times a day.     • mirtazapine (REMERON) 15 MG TABS Take 15 mg by mouth every evening.     • Melatonin 1 MG CAPS Take  by mouth.     • bisacodyl (DULCOLAX) 10 MG SUPP Insert 1 Suppository in rectum 1 time daily as needed (; If Milk of Magnesia ineffective after one day).     • risperidone (RISPERDAL) 0.5 MG TABS Take 0.5 mg by mouth every day.     • donepezil (ARICEPT) 5 MG TABS Take 5 mg by mouth every evening.     • hydrocodone/acetaminophen (NORCO)  MG TABS Take 1 Tab by mouth every 6 hours as needed. Indications: Moderate to Moderately Severe Pain         ALLERGIES  Allergies   Allergen Reactions   • Darvon [Propoxyphene] Hives       PHYSICAL EXAM  VITAL SIGNS: BP (!) 85/59   Pulse 62   Temp 36.7 °C (98 °F)   Resp 16   Ht 1.829 m (6')   Wt 68.9 kg (152 lb)   SpO2 98%   BMI 20.61 kg/m²     Constitutional: Well developed, Well nourished, Mild distress.   HENT: Normocephalic, Atraumatic, Oropharynx moist. Abrasion to upper gum and upper lip.  Eyes: Conjunctiva normal, No discharge.   Neck: Supple, No stridor, no tenderness.  Cardiovascular: Normal heart rate, Normal rhythm, No murmurs, equal pulses.  Pulmonary: Normal breath sounds, No respiratory distress, No wheezing, No rales, No rhonchi.  Chest: Tender over right anterior chest, no crepitance or deformity.  Abdomen:Soft, No tenderness, No masses, no rebound, no guarding.   Back: No CVA tenderness.   Musculoskeletal: No major deformities noted, No tenderness.   Skin: 10 cm x 2 cm skin tear mid forearm, proximal to that 2.5 cm skin tear, 1.5 skin tear over elbow, 0.5 cm skin tear over distal ulnar. Warm, Dry.  Neurologic: Alert &  oriented x 3, Normal motor function,  No focal deficits noted.   Psychiatric: Affect normal, Judgment normal, Mood normal.     LABS  Results for orders placed or performed during the hospital encounter of 02/04/18   CBC w/ Differential   Result Value Ref Range    WBC 15.4 (H) 4.8 - 10.8 K/uL    RBC 3.85 (L) 4.70 - 6.10 M/uL    Hemoglobin 12.4 (L) 14.0 - 18.0 g/dL    Hematocrit 36.5 (L) 42.0 - 52.0 %    MCV 94.8 81.4 - 97.8 fL    MCH 32.2 27.0 - 33.0 pg    MCHC 34.0 33.7 - 35.3 g/dL    RDW 43.6 35.9 - 50.0 fL    Platelet Count 213 164 - 446 K/uL    MPV 8.9 (L) 9.0 - 12.9 fL    Nucleated RBC 0.00 /100 WBC    NRBC (Absolute) 0.00 K/uL    Neutrophils-Polys 64.00 44.00 - 72.00 %    Lymphocytes 31.60 22.00 - 41.00 %    Monocytes 3.50 0.00 - 13.40 %    Eosinophils 0.00 0.00 - 6.90 %    Basophils 0.00 0.00 - 1.80 %    Neutrophils (Absolute) 9.99 (H) 1.82 - 7.42 K/uL    Lymphs (Absolute) 4.87 (H) 1.00 - 4.80 K/uL    Monos (Absolute) 0.54 0.00 - 0.85 K/uL    Eos (Absolute) 0.00 0.00 - 0.51 K/uL    Baso (Absolute) 0.00 0.00 - 0.12 K/uL   Complete Metabolic Panel (CMP)   Result Value Ref Range    Sodium 127 (L) 135 - 145 mmol/L    Potassium 3.7 3.6 - 5.5 mmol/L    Chloride 99 96 - 112 mmol/L    Co2 23 20 - 33 mmol/L    Anion Gap 5.0 0.0 - 11.9    Glucose 82 65 - 99 mg/dL    Bun 7 (L) 8 - 22 mg/dL    Creatinine 0.72 0.50 - 1.40 mg/dL    Calcium 8.0 (L) 8.5 - 10.5 mg/dL    AST(SGOT) 36 12 - 45 U/L    ALT(SGPT) 27 2 - 50 U/L    Alkaline Phosphatase 68 30 - 99 U/L    Total Bilirubin 0.5 0.1 - 1.5 mg/dL    Albumin 3.1 (L) 3.2 - 4.9 g/dL    Total Protein 5.2 (L) 6.0 - 8.2 g/dL    Globulin 2.1 1.9 - 3.5 g/dL    A-G Ratio 1.5 g/dL   Troponin STAT   Result Value Ref Range    Troponin I <0.01 0.00 - 0.04 ng/mL   DIAGNOSTIC ALCOHOL   Result Value Ref Range    Diagnostic Alcohol 0.01 (H) 0.00 g/dL   APTT   Result Value Ref Range    APTT 28.1 24.7 - 36.0 sec   PROTHROMBIN TIME   Result Value Ref Range    PT 14.1 12.0 - 14.6 sec    INR 1.12  0.87 - 1.13   ESTIMATED GFR   Result Value Ref Range    GFR If African American >60 >60 mL/min/1.73 m 2    GFR If Non African American >60 >60 mL/min/1.73 m 2   DIFFERENTIAL MANUAL   Result Value Ref Range    Bands-Stabs 0.90 0.00 - 10.00 %    Manual Diff Status PERFORMED    PERIPHERAL SMEAR REVIEW   Result Value Ref Range    Peripheral Smear Review see below    PLATELET ESTIMATE   Result Value Ref Range    Plt Estimation Normal    MORPHOLOGY   Result Value Ref Range    RBC Morphology Present     Smudge Cells Moderate    EKG (ER)   Result Value Ref Range    Report       [facility] Emergency [facility].    Test Date:  2018  Pt Name:    DAVID GUALLPA                  Department: ER  MRN:        [id number]                      Room:        21  Gender:     Male                         Technician: [id number]  :        1945                   Requested By:ER TRIAGE PROTOCOL  Order #:    [id number]                    Reading MD:    Measurements  Intervals                                Axis  Rate:       65                           P:          0  TX:         132                          QRS:        7  QRSD:       94                           T:          50  QT:         452  QTc:        470    Interpretive Statements  WANDERING PACEMAKER  LOW VOLTAGE IN FRONTAL LEADS  Compared to ECG 2015 14:43:18  Wandering atrial pacemaker now present  Low QRS voltage now present  Sinus rhythm no longer present  T-wave abnormality no longer present       All labs reviewed by me.    EKG  12 Lead EKG interpreted by me shows a wandering pacemaker rhythm at a rate of 65. Axis normal. No ST elevations. No T wave inversions. TX interval 132.  QTc 470  Old EKG from 5/6/15 shows new wandering pacemaker. Interpretation wandering pacemaker     RADIOLOGY  [id number] (WITH 1-VIEW CXR) RIGHT   Final Result         1.  Possible subtle anterior lateral ninth rib fracture.   2.  Atherosclerosis      CT-HEAD W/O   Final  Result         1.  No acute intracranial abnormality is identified, there are changes of small vessel ischemic disease with mild atrophy noted.   2.  Atherosclerosis.        The radiologist's interpretation of all radiological studies have been reviewed by me.    COURSE & MEDICAL DECISION MAKING  Pertinent Labs & Imaging studies reviewed. (See chart for details)    7:33 PM - Patient seen and examined at bedside. Patient will be treated with 1,000 ml NS Infusion secondary to hypotension. Ordered head CT, right rib x-ray, APTT, Prothrombin time, Diagnostic alcohol, CBC with differential, CMP, Troponin STAT, Wound irrigation, and an EKG to evaluate his symptoms. The differential diagnoses include but are not limited to: intracranial hemorrhage, hypotension, electrolyte abnormality, skin tears, rib injury.    8:57 PM Paged [facility].    8:59 PM Recheck: Patient re-evaluated at beside. There was a positive response to IV fluids after patient reevaluation. Blood pressure was improved but patient continues complaining of dizziness. Discussed patient's condition and treatment plan. Patient's lab and radiology results discussed. The patient understood and is in agreement.     9:12 PM - I discussed the patient's case and the above findings with [facility] who will admit the patient.    Medical Decision Making: At this point time I think the patient would benefit from admission for severe orthostatic dizziness. I'm afraid that if he goes home these again likely fall again. He also will need pain control for his rib fracture. CT of the head is negative for intracranial hemorrhage. His EKG is unremarkable except for a wandering pacemaker. But his blood pressure seems to be normal. I think he may need an echo to make sure he does not have an abnormality with the structural function of his heart. Patient's skin tears have been dressed.    DISPOSITION:  Patient will be admitted to [facility] in  guarded condition.     FINAL IMPRESSION  1. Orthostatic syncope    2. Hyponatremia    3. Closed fracture of one rib of right side, initial encounter    4. Multiple skin tears          Greta CAST (Scribe), am scribing for, and in the presence of, Shree Miner M.D.    Electronically signed by: Greta Hernández (Scribe), 2/4/2018    IShree M.D. personally performed the services described in this documentation, as scribed by Greta Hernández in my presence, and it is both accurate and complete.    The note accurately reflects work and decisions made by me.  Shree Miner  2/5/2018  1:34 AM

## 2018-02-05 NOTE — PROGRESS NOTES
Pt brought to room via [facility] by [place] and Daniele Rns.  Pt able to transfer from bed to bed.  Monitor room called - NSR 67-70 w/ freq PACs

## 2018-02-05 NOTE — ASSESSMENT & PLAN NOTE
-On admission CMP showed Na of 127,Repeat after IVF is 134. Likely secondary to dehydration or due to known lung mass.  -History of Lung mass 5.3 mm spiculated mass in the right apex   -continue to monitor as outpatient.

## 2018-02-05 NOTE — ED NOTES
Wound irrigated and dressed by ED tech. Urine sample collected and sent. Report called to receiving RN.

## 2018-02-05 NOTE — H&P
Internal Medicine Admitting History and Physical    Note Author: Catrachito Ramírez M.D.       Name Jose Armando Wu     1945   Age/Sex 72 y.o. male   MRN [id number]   Code Status Full code      After 5PM or if no immediate response to page, please call for cross-coverage  Attending/Team: Dr. Farias See Patient List for primary contact information  Call [phone] to page    1st Call - Day Intern (R1):   Taylor 2nd Call - Day Sr. Resident (R2/R3):   Dr. machado       Chief Complaint:  Fall      HPI:  Mr. Wu is a pleasant 72 years old male with past medical hx of alcohol abuse, stroke, anemia, COPD and GERD present to the ED today because of a GLF, he was trying to get up from a seating position and felt dizzy ( lightheaded ) and fell down, hitting his Rt arm, chest and face. Denies loss of consciousness, nausea, vomiting, diarrhea, palpitation, chest pain and weakness. Patient was found to be hypotensive in the ED given 1 L of fluid. He lives in a group home, state that he does not drink alcohol, not on blood thinner, no recent change in his med list, no history of previous lightheadedness.          Review of Systems   Constitutional: Negative for chills, diaphoresis, fever, malaise/fatigue and weight loss.   HENT: Negative for congestion, ear discharge, ear pain, hearing loss, nosebleeds, sinus pain, sore throat and tinnitus.    Eyes: Negative for blurred vision, double vision, photophobia, pain, discharge and redness.   Respiratory: Positive for cough. Negative for hemoptysis, sputum production, shortness of breath, wheezing and stridor.    Cardiovascular: Positive for chest pain (Rt side ). Negative for palpitations, orthopnea, claudication, leg swelling and PND.   Gastrointestinal: Negative for abdominal pain, blood in stool, constipation, diarrhea, heartburn, melena, nausea and vomiting.   Genitourinary: Negative for dysuria, flank pain, frequency, hematuria and urgency.   Musculoskeletal:  Positive for falls. Negative for back pain, joint pain, myalgias and neck pain.   Skin: Negative for itching and rash.   Neurological: Positive for dizziness. Negative for tingling, tremors, sensory change, speech change, focal weakness, seizures, loss of consciousness, weakness and headaches.   Endo/Heme/Allergies: Negative for polydipsia. Does not bruise/bleed easily.   Psychiatric/Behavioral: Negative for depression, hallucinations, memory loss, substance abuse and suicidal ideas. The patient is not nervous/anxious and does not have insomnia.              Past Medical History:   Past Medical History:   Diagnosis Date   • Alcohol abuse    • Anemia    • Arthritis    • COPD (chronic obstructive pulmonary disease)    • GERD (gastroesophageal reflux disease)    • Stroke    • Tobacco abuse        Past Surgical History:  Past Surgical History:   Procedure Laterality Date   • HIP NAILING INTRAMEDULLARY  8/16/2013    Performed by Trenton Everett M.D. at [facility]   • OTHER ORTHOPEDIC SURGERY         Current Outpatient Medications:  Home Medications     Reviewed by Doris Pizano, Pharmacy Intern (Pharmacy Intern) on 02/04/18 at 2153  Med List Status: Complete   Medication Last Dose Status   cyanocobalamin (VITAMIN B-12) 100 MCG TABS unk Active   docusate sodium (COLACE) 100 MG Cap unk Active   donepezil (ARICEPT) 10 MG tablet unk Active   erythromycin 5 MG/GM Ointment unk Active   gabapentin (NEURONTIN) 300 MG CAPS unk Active   mirtazapine (REMERON) 15 MG TABS unk Active   oxyCODONE-acetaminophen (PERCOCET-10)  MG Tab unk Active   PARoxetine (PAXIL) 20 MG Tab unk Active   risperidone (RISPERDAL) 0.5 MG TABS unk Active   tiotropium (SPIRIVA) 18 MCG CAPS unk Active                Medication Allergy/Sensitivities:  Allergies   Allergen Reactions   • Darvon [Propoxyphene] Hives         Family History:  No family history on file.    Social History:  Social History     Social History   • Marital status:  Single     Spouse name: N/A   • Number of children: N/A   • Years of education: N/A     Occupational History   • Not on file.     Social History Main Topics   • Smoking status: Current Every Day Smoker     Packs/day: 0.50     Years: 50.00   • Smokeless tobacco: Not on file   • Alcohol use Yes      Comment: +ETOH abuse   • Drug use: Unknown   • Sexual activity: Not on file     Other Topics Concern   • Not on file     Social History Narrative   • No narrative on file     Living situation: lives in group home   PCP : SHERLEY Santos        Physical Exam     Vitals:    02/04/18 1900 02/04/18 2100 02/04/18 2130 02/04/18 2226   BP:       Pulse: 62      Resp:    (!) 5   Temp:       SpO2: 98% 94% 95%    Weight:       Height:         Body mass index is 20.61 kg/m².  BP (!) 85/59   Pulse 62   Temp 36.7 °C (98 °F)   Resp (!) 5   Ht 1.829 m (6')   Wt 68.9 kg (152 lb)   SpO2 95%   BMI 20.61 kg/m²   O2 therapy: Pulse Oximetry: 95 %, O2 Delivery: None (Room Air)    Physical Exam   Constitutional: He is well-developed, well-nourished, and in no distress. No distress.   HENT:   Mouth/Throat: No oropharyngeal exudate.   Blood over the Lt cheek, no wound seen, mostly bleed from the the lips, no source of bleeding intraorally      Eyes: Pupils are equal, round, and reactive to light.   Neck: Normal range of motion. No thyromegaly present.   Cardiovascular: Normal rate and regular rhythm.  Exam reveals no gallop and no friction rub.    No murmur heard.  Pulmonary/Chest: Effort normal. No stridor. No respiratory distress. He has no wheezes. He has no rales.   Tenderness over the Rt lower chest wall     Abdominal: Soft. He exhibits no distension. There is no tenderness. There is no rebound.   Musculoskeletal: He exhibits tenderness (tenderness over the Rt forearm ). He exhibits no edema or deformity.   Neurological: He is alert. No cranial nerve deficit.   Skin: He is not diaphoretic. No erythema. No pallor.              Data Review       Old Records Request:   Completed  Current Records review and summary: Completed    Lab Data Review:  Recent Results (from the past 24 hour(s))   EKG (ER)    Collection Time: 18  7:20 PM   Result Value Ref Range    Report       [facility] Emergency Dept[facility]    Test Date:  2018  Pt Name:    DAVID GUALLPA                  Department: ER  MRN:        [id number]                      Room:        21  Gender:     Male                         Technician: [id number]  :        1945                   Requested By:ER TRIAGE PROTOCOL  Order #:    [id number]                    Reading MD:    Measurements  Intervals                                Axis  Rate:       65                           P:          0  ME:         132                          QRS:        7  QRSD:       94                           T:          50  QT:         452  QTc:        470    Interpretive Statements  WANDERING PACEMAKER  LOW VOLTAGE IN FRONTAL LEADS  Compared to ECG 2015 14:43:18  Wandering atrial pacemaker now present  Low QRS voltage now present  Sinus rhythm no longer present  T-wave abnormality no longer present     CBC w/ Differential    Collection Time: 18  7:51 PM   Result Value Ref Range    WBC 15.4 (H) 4.8 - 10.8 K/uL    RBC 3.85 (L) 4.70 - 6.10 M/uL    Hemoglobin 12.4 (L) 14.0 - 18.0 g/dL    Hematocrit 36.5 (L) 42.0 - 52.0 %    MCV 94.8 81.4 - 97.8 fL    MCH 32.2 27.0 - 33.0 pg    MCHC 34.0 33.7 - 35.3 g/dL    RDW 43.6 35.9 - 50.0 fL    Platelet Count 213 164 - 446 K/uL    MPV 8.9 (L) 9.0 - 12.9 fL    Nucleated RBC 0.00 /100 WBC    NRBC (Absolute) 0.00 K/uL    Neutrophils-Polys 64.00 44.00 - 72.00 %    Lymphocytes 31.60 22.00 - 41.00 %    Monocytes 3.50 0.00 - 13.40 %    Eosinophils 0.00 0.00 - 6.90 %    Basophils 0.00 0.00 - 1.80 %    Neutrophils (Absolute) 9.99 (H) 1.82 - 7.42 K/uL    Lymphs (Absolute) 4.87 (H) 1.00 - 4.80 K/uL    Monos (Absolute) 0.54 0.00 - 0.85 K/uL     Eos (Absolute) 0.00 0.00 - 0.51 K/uL    Baso (Absolute) 0.00 0.00 - 0.12 K/uL   Complete Metabolic Panel (CMP)    Collection Time: 02/04/18  7:51 PM   Result Value Ref Range    Sodium 127 (L) 135 - 145 mmol/L    Potassium 3.7 3.6 - 5.5 mmol/L    Chloride 99 96 - 112 mmol/L    Co2 23 20 - 33 mmol/L    Anion Gap 5.0 0.0 - 11.9    Glucose 82 65 - 99 mg/dL    Bun 7 (L) 8 - 22 mg/dL    Creatinine 0.72 0.50 - 1.40 mg/dL    Calcium 8.0 (L) 8.5 - 10.5 mg/dL    AST(SGOT) 36 12 - 45 U/L    ALT(SGPT) 27 2 - 50 U/L    Alkaline Phosphatase 68 30 - 99 U/L    Total Bilirubin 0.5 0.1 - 1.5 mg/dL    Albumin 3.1 (L) 3.2 - 4.9 g/dL    Total Protein 5.2 (L) 6.0 - 8.2 g/dL    Globulin 2.1 1.9 - 3.5 g/dL    A-G Ratio 1.5 g/dL   Troponin STAT    Collection Time: 02/04/18  7:51 PM   Result Value Ref Range    Troponin I <0.01 0.00 - 0.04 ng/mL   DIAGNOSTIC ALCOHOL    Collection Time: 02/04/18  7:51 PM   Result Value Ref Range    Diagnostic Alcohol 0.01 (H) 0.00 g/dL   APTT    Collection Time: 02/04/18  7:51 PM   Result Value Ref Range    APTT 28.1 24.7 - 36.0 sec   PROTHROMBIN TIME    Collection Time: 02/04/18  7:51 PM   Result Value Ref Range    PT 14.1 12.0 - 14.6 sec    INR 1.12 0.87 - 1.13   ESTIMATED GFR    Collection Time: 02/04/18  7:51 PM   Result Value Ref Range    GFR If African American >60 >60 mL/min/1.73 m 2    GFR If Non African American >60 >60 mL/min/1.73 m 2   DIFFERENTIAL MANUAL    Collection Time: 02/04/18  7:51 PM   Result Value Ref Range    Bands-Stabs 0.90 0.00 - 10.00 %    Manual Diff Status PERFORMED    PERIPHERAL SMEAR REVIEW    Collection Time: 02/04/18  7:51 PM   Result Value Ref Range    Peripheral Smear Review see below    PLATELET ESTIMATE    Collection Time: 02/04/18  7:51 PM   Result Value Ref Range    Plt Estimation Normal    MORPHOLOGY    Collection Time: 02/04/18  7:51 PM   Result Value Ref Range    RBC Morphology Present     Smudge Cells Moderate    CREATINE KINASE    Collection Time: 02/04/18  7:51 PM    Result Value Ref Range    CPK Total 90 0 - 154 U/L   TSH (Thyroid Stimulating Hormone)    Collection Time: 02/04/18  7:51 PM   Result Value Ref Range    TSH 1.450 0.380 - 5.330 uIU/mL   URINE DRUG SCREEN    Collection Time: 02/04/18 10:15 PM   Result Value Ref Range    Amphetamines Urine Negative Negative    Barbiturates Negative Negative    Benzodiazepines Negative Negative    Cocaine Metabolite Negative Negative    Methadone Negative Negative    Opiates Negative Negative    Oxycodone Positive (A) Negative    Phencyclidine -Pcp Negative Negative    Propoxyphene Negative Negative    Cannabinoid Metab Negative Negative   URINALYSIS    Collection Time: 02/04/18 10:15 PM   Result Value Ref Range    Color Yellow     Character Clear     Specific Gravity 1.004 <1.035    Ph 6.5 5.0 - 8.0    Glucose Negative Negative mg/dL    Ketones Negative Negative mg/dL    Protein Negative Negative mg/dL    Bilirubin Negative Negative    Urobilinogen, Urine 0.2 Negative    Nitrite Negative Negative    Leukocyte Esterase Negative Negative    Occult Blood Negative Negative    Micro Urine Req see below    URINE SODIUM RANDOM    Collection Time: 02/04/18 10:15 PM   Result Value Ref Range    Sodium, Urine -per volume 23 mmol/L   URINE CREATININE RANDOM    Collection Time: 02/04/18 10:15 PM   Result Value Ref Range    Creatinine, Random Urine 32.80 mg/dL   URINE POTASSIUM RANDOM    Collection Time: 02/04/18 10:15 PM   Result Value Ref Range    Potassium 6.3 mmol/L       Imaging/Procedures Review:    ndependant Imaging Review: Completed  DX-CHEST-LIMITED (1 VIEW)   Final Result         1.  No acute cardiopulmonary disease.   2.  Atherosclerosis      [id number] (WITH 1-VIEW CXR) RIGHT   Final Result         1.  Possible subtle anterior lateral ninth rib fracture.   2.  Atherosclerosis      CT-HEAD W/O   Final Result         1.  No acute intracranial abnormality is identified, there are changes of small vessel ischemic disease with  mild atrophy noted.   2.  Atherosclerosis.      ECHOCARDIOGRAM COMP W/O CONT    (Results Pending)             EKG:   EKG Independant Review: Completed  QTc:470, HR: 65, Normal Sinus Rhythm, no ST/T changes      ( ) Records reviewed and summarized in current documentation             Assessment/Plan     Lung mass- (present on admission)   Assessment & Plan    72 years old male with history of smoking and COPD with spiculated nodule right lung apex and 5.3 mm ill-defined nodule right lung apex.  Plan:   - last CT scan was in 2016, morning team to discuss with patient repeating CT scan and goals of care.            Hyponatremia- (present on admission)   Assessment & Plan    In the ED CMP showed Na of 127, no CNS symptoms, patient was mildly dehydrated, given NS bolus.   - he has history of lung mass and smoker.   Plan:   - repeat CMP am after hydration   - order urine lytes   - possible SIADH given the history of unknown lung lesion    - morning team to discuss with patient goals of care           Chest pain- (present on admission)   Assessment & Plan    72 years old male with hx of GLF complaining from Rt side chest pain after the fall, chest clear to auscultation, SPO2 95  Ribs xr showed possible subtle anterior lateral ninth rib fracture   Plan:   - PRN tylenol for pain           Leukocytosis- (present on admission)   Assessment & Plan    In the ED CBC showed WBC of 15.4, no hx of fever, sweating and dysuria, infection is unlikely, could be due to dehydration  Plan:   - Start NS infusion   - repeat CBC am   - order procalcitonin    - might consider ABx if Procalcitonin will be elevated         Fall from ground level- (present on admission)   Assessment & Plan    72 years old male presented with GLF while trying to get up from a seating position and felt dizzy ( lightheaded ) and fell down, hitting his Rt arm, chest and face. Denies loss of consciousness, nausea, vomiting, diarrhea, palpitation, chest pain and  weakness. state that he does not drink alcohol,no recent change in his med list, no history of previous lightheadedness.   Plan:   - cause of lightheadedness unknown, possible DDX include dehydration, AS, Polypharmacy, alcohol and TIA   - blood ETOH 0.01. UDS + for OXy.   - CT scan showed no acute finding  - tele monitor   - order ECHO                   Anticipated Hospital stay:  >2 midnights        Quality Measures    Reviewed items::  EKG reviewed, Radiology images reviewed, Labs reviewed and Medications reviewed  Skelton catheter::  No Skelton  DVT prophylaxis pharmacological::  Enoxaparin (Lovenox)  Ulcer Prophylaxis::  Not indicated

## 2018-02-05 NOTE — RESPIRATORY CARE
COPD EDUCATION by COPD CLINICAL EDUCATOR  2/5/2018 at 11:45 AM by Julissa Matthews     Patient interviewed by COPD education team. Patient refused COPD program at this time. A comprehensive packet including information about COPD, treatments, and smoking cessation given.

## 2018-02-05 NOTE — DISCHARGE PLANNING
SW spoke with Geriatric MD. Per Geriatric MD, pt from [facility].     Per RN, pt has a guardian, Sherry ([phone]) and available until 1700.

## 2018-02-05 NOTE — ASSESSMENT & PLAN NOTE
-Previous imaging studies showed 5 x 11.7 mm spiculated nodule right lung apex similar to findings in 3/26/2015  -Scattered atelectasis/fibrosis and mild mediastinal,hilar adenopathy.  -Patient refused any interventions of the mass. Discussed with guardian about this, it has been known to her as well and does not want aggressive measure given overall prognosis with dementia.

## 2018-02-05 NOTE — ASSESSMENT & PLAN NOTE
-72 years old male presented after a unwitnessed GLF on admission with symptoms of dizziness,lightheadedness prior to the fall. Denies any prodromal symptoms of diaphoresis,palpitations, SOB or chest pain.  -Patient ambulates with a cane when required to use a walker per guardian.  - telemetry showed SR-ST up to 100s.  -ECHO showed EF 55%, mild AS unchanged from 2016.  -Differential diagnosis : Polypharmacy, Orthostatic hypotension  -Consulted geriatrics.Appreciate Rec's. Recommended to taper his donepezil, anitpsychotics and SSRIs gradually as tolerated.

## 2018-02-05 NOTE — ED TRIAGE NOTES
Chief Complaint   Patient presents with   • Fall     stood up from seated position and got dizzy   • Arm Pain     skin tear from R forearm

## 2018-02-05 NOTE — ED NOTES
Pt back from Ct; orthostatics completed:     Supine: 58P, 95%, 105/67 EDDIE  SittinP, 95%, 113/66 EDDIE  StandinP, 94%, 123/103 EDDIE (pt very dizzy upon standing)    X-Ray at the bedside.

## 2018-02-06 PROBLEM — Z71.89 GOALS OF CARE, COUNSELING/DISCUSSION: Status: ACTIVE | Noted: 2018-02-06

## 2018-02-06 LAB
25(OH)D3 SERPL-MCNC: 7 NG/ML (ref 30–100)
ANION GAP SERPL CALC-SCNC: 6 MMOL/L (ref 0–11.9)
ANISOCYTOSIS BLD QL SMEAR: ABNORMAL
BASOPHILS # BLD AUTO: 0.9 % (ref 0–1.8)
BASOPHILS # BLD: 0.14 K/UL (ref 0–0.12)
BUN SERPL-MCNC: 5 MG/DL (ref 8–22)
CALCIUM SERPL-MCNC: 7.8 MG/DL (ref 8.5–10.5)
CHLORIDE SERPL-SCNC: 98 MMOL/L (ref 96–112)
CO2 SERPL-SCNC: 23 MMOL/L (ref 20–33)
CREAT SERPL-MCNC: 0.64 MG/DL (ref 0.5–1.4)
EOSINOPHIL # BLD AUTO: 0 K/UL (ref 0–0.51)
EOSINOPHIL NFR BLD: 0 % (ref 0–6.9)
ERYTHROCYTE [DISTWIDTH] IN BLOOD BY AUTOMATED COUNT: 42.6 FL (ref 35.9–50)
GIANT PLATELETS BLD QL SMEAR: NORMAL
GLUCOSE SERPL-MCNC: 89 MG/DL (ref 65–99)
HCT VFR BLD AUTO: 36.2 % (ref 42–52)
HGB BLD-MCNC: 12.6 G/DL (ref 14–18)
LG PLATELETS BLD QL SMEAR: NORMAL
LYMPHOCYTES # BLD AUTO: 7.04 K/UL (ref 1–4.8)
LYMPHOCYTES NFR BLD: 44.3 % (ref 22–41)
MACROCYTES BLD QL SMEAR: ABNORMAL
MANUAL DIFF BLD: NORMAL
MCH RBC QN AUTO: 32.1 PG (ref 27–33)
MCHC RBC AUTO-ENTMCNC: 34.8 G/DL (ref 33.7–35.3)
MCV RBC AUTO: 92.1 FL (ref 81.4–97.8)
MONOCYTES # BLD AUTO: 0.27 K/UL (ref 0–0.85)
MONOCYTES NFR BLD AUTO: 1.7 % (ref 0–13.4)
MORPHOLOGY BLD-IMP: NORMAL
NEUTROPHILS # BLD AUTO: 8.44 K/UL (ref 1.82–7.42)
NEUTROPHILS NFR BLD: 48.7 % (ref 44–72)
NEUTS BAND NFR BLD MANUAL: 4.4 % (ref 0–10)
NRBC # BLD AUTO: 0 K/UL
NRBC BLD-RTO: 0 /100 WBC
PLATELET # BLD AUTO: 230 K/UL (ref 164–446)
PLATELET BLD QL SMEAR: NORMAL
PMV BLD AUTO: 8.9 FL (ref 9–12.9)
POTASSIUM SERPL-SCNC: 3.6 MMOL/L (ref 3.6–5.5)
RBC # BLD AUTO: 3.93 M/UL (ref 4.7–6.1)
RBC BLD AUTO: PRESENT
SMUDGE CELLS BLD QL SMEAR: NORMAL
SODIUM SERPL-SCNC: 127 MMOL/L (ref 135–145)
WBC # BLD AUTO: 15.9 K/UL (ref 4.8–10.8)

## 2018-02-06 PROCEDURE — 36415 COLL VENOUS BLD VENIPUNCTURE: CPT

## 2018-02-06 PROCEDURE — A9270 NON-COVERED ITEM OR SERVICE: HCPCS | Performed by: STUDENT IN AN ORGANIZED HEALTH CARE EDUCATION/TRAINING PROGRAM

## 2018-02-06 PROCEDURE — 85007 BL SMEAR W/DIFF WBC COUNT: CPT

## 2018-02-06 PROCEDURE — 82306 VITAMIN D 25 HYDROXY: CPT

## 2018-02-06 PROCEDURE — 700111 HCHG RX REV CODE 636 W/ 250 OVERRIDE (IP): Performed by: INTERNAL MEDICINE

## 2018-02-06 PROCEDURE — 770020 HCHG ROOM/CARE - TELE (206)

## 2018-02-06 PROCEDURE — G8978 MOBILITY CURRENT STATUS: HCPCS | Mod: CJ

## 2018-02-06 PROCEDURE — A9270 NON-COVERED ITEM OR SERVICE: HCPCS | Performed by: INTERNAL MEDICINE

## 2018-02-06 PROCEDURE — 99232 SBSQ HOSP IP/OBS MODERATE 35: CPT | Mod: GC | Performed by: INTERNAL MEDICINE

## 2018-02-06 PROCEDURE — 700102 HCHG RX REV CODE 250 W/ 637 OVERRIDE(OP): Performed by: INTERNAL MEDICINE

## 2018-02-06 PROCEDURE — 80048 BASIC METABOLIC PNL TOTAL CA: CPT

## 2018-02-06 PROCEDURE — 85027 COMPLETE CBC AUTOMATED: CPT

## 2018-02-06 PROCEDURE — 700105 HCHG RX REV CODE 258: Performed by: STUDENT IN AN ORGANIZED HEALTH CARE EDUCATION/TRAINING PROGRAM

## 2018-02-06 PROCEDURE — 700102 HCHG RX REV CODE 250 W/ 637 OVERRIDE(OP): Performed by: STUDENT IN AN ORGANIZED HEALTH CARE EDUCATION/TRAINING PROGRAM

## 2018-02-06 PROCEDURE — 97161 PT EVAL LOW COMPLEX 20 MIN: CPT

## 2018-02-06 PROCEDURE — G8979 MOBILITY GOAL STATUS: HCPCS | Mod: CI

## 2018-02-06 RX ORDER — OXYCODONE AND ACETAMINOPHEN 10; 325 MG/1; MG/1
1 TABLET ORAL 2 TIMES DAILY
Status: DISCONTINUED | OUTPATIENT
Start: 2018-02-06 | End: 2018-02-06

## 2018-02-06 RX ORDER — PAROXETINE 10 MG/1
20 TABLET, FILM COATED ORAL DAILY
Status: DISCONTINUED | OUTPATIENT
Start: 2018-02-06 | End: 2018-02-08 | Stop reason: HOSPADM

## 2018-02-06 RX ORDER — DONEPEZIL HYDROCHLORIDE 5 MG/1
5 TABLET, FILM COATED ORAL NIGHTLY
Status: DISCONTINUED | OUTPATIENT
Start: 2018-02-06 | End: 2018-02-08 | Stop reason: HOSPADM

## 2018-02-06 RX ORDER — MIRTAZAPINE 15 MG/1
15 TABLET, FILM COATED ORAL NIGHTLY
Status: DISCONTINUED | OUTPATIENT
Start: 2018-02-06 | End: 2018-02-08 | Stop reason: HOSPADM

## 2018-02-06 RX ORDER — OXYCODONE AND ACETAMINOPHEN 10; 325 MG/1; MG/1
1 TABLET ORAL 2 TIMES DAILY PRN
Status: DISCONTINUED | OUTPATIENT
Start: 2018-02-06 | End: 2018-02-06

## 2018-02-06 RX ORDER — OXYCODONE AND ACETAMINOPHEN 10; 325 MG/1; MG/1
1 TABLET ORAL EVERY 6 HOURS PRN
Status: DISCONTINUED | OUTPATIENT
Start: 2018-02-06 | End: 2018-02-07

## 2018-02-06 RX ADMIN — TIOTROPIUM BROMIDE 1 CAPSULE: 18 CAPSULE ORAL; RESPIRATORY (INHALATION) at 08:43

## 2018-02-06 RX ADMIN — SODIUM CHLORIDE: 9 INJECTION, SOLUTION INTRAVENOUS at 05:52

## 2018-02-06 RX ADMIN — OXYCODONE HYDROCHLORIDE AND ACETAMINOPHEN 1 TABLET: 10; 325 TABLET ORAL at 01:32

## 2018-02-06 RX ADMIN — OXYCODONE HYDROCHLORIDE AND ACETAMINOPHEN 1 TABLET: 10; 325 TABLET ORAL at 08:43

## 2018-02-06 RX ADMIN — ENOXAPARIN SODIUM 30 MG: 100 INJECTION SUBCUTANEOUS at 08:43

## 2018-02-06 RX ADMIN — ACETAMINOPHEN 650 MG: 325 TABLET, FILM COATED ORAL at 05:52

## 2018-02-06 RX ADMIN — MIRTAZAPINE 15 MG: 15 TABLET, FILM COATED ORAL at 20:46

## 2018-02-06 RX ADMIN — DONEPEZIL HYDROCHLORIDE 5 MG: 5 TABLET, FILM COATED ORAL at 20:46

## 2018-02-06 RX ADMIN — NICOTINE 21 MG: 21 PATCH, EXTENDED RELEASE TRANSDERMAL at 05:52

## 2018-02-06 RX ADMIN — PAROXETINE HYDROCHLORIDE 20 MG: 10 TABLET, FILM COATED ORAL at 08:43

## 2018-02-06 RX ADMIN — OXYCODONE HYDROCHLORIDE AND ACETAMINOPHEN 1 TABLET: 10; 325 TABLET ORAL at 20:50

## 2018-02-06 ASSESSMENT — ENCOUNTER SYMPTOMS
HEADACHES: 0
NERVOUS/ANXIOUS: 0
FEVER: 0
MYALGIAS: 0
SORE THROAT: 0
DOUBLE VISION: 0
DIZZINESS: 0
NAUSEA: 0
CHILLS: 0
VOMITING: 0
BLURRED VISION: 0
SHORTNESS OF BREATH: 0
ORTHOPNEA: 0
LOSS OF CONSCIOUSNESS: 0
ABDOMINAL PAIN: 0
WEAKNESS: 1
HEARTBURN: 0
COUGH: 0
FLANK PAIN: 0
FOCAL WEAKNESS: 0
PALPITATIONS: 0

## 2018-02-06 ASSESSMENT — PAIN SCALES - GENERAL
PAINLEVEL_OUTOF10: 10
PAINLEVEL_OUTOF10: 10
PAINLEVEL_OUTOF10: 3
PAINLEVEL_OUTOF10: 9
PAINLEVEL_OUTOF10: 8
PAINLEVEL_OUTOF10: 10
PAINLEVEL_OUTOF10: 0
PAINLEVEL_OUTOF10: 0

## 2018-02-06 ASSESSMENT — COGNITIVE AND FUNCTIONAL STATUS - GENERAL
SUGGESTED CMS G CODE MODIFIER MOBILITY: CJ
MOBILITY SCORE: 20
MOVING FROM LYING ON BACK TO SITTING ON SIDE OF FLAT BED: A LITTLE
WALKING IN HOSPITAL ROOM: A LITTLE
CLIMB 3 TO 5 STEPS WITH RAILING: A LITTLE
STANDING UP FROM CHAIR USING ARMS: A LITTLE

## 2018-02-06 ASSESSMENT — GAIT ASSESSMENTS
DISTANCE (FEET): 100
GAIT LEVEL OF ASSIST: STAND BY ASSIST
ASSISTIVE DEVICE: FRONT WHEEL WALKER

## 2018-02-06 NOTE — PROGRESS NOTES
"Called MD about pt complaining of 10/10 pain on right rib from rib fracture. Pt has PRN tylenol not due until 10:30 tonight.    Orders received: \"I will put in for tramadol\"  "

## 2018-02-06 NOTE — DOCUMENTATION QUERY
DOCUMENTATION QUERY    PROVIDERS: Please select “Cosign w/ note” to reply to query.    To better represent the severity of illness of your patient, please review the following information and exercise your independent professional judgment in responding to this query.     He appears malnourished is documented in the 2/5 Hospitalist Progress Notes. Based upon the clinical findings, risk factors, and treatment, can a diagnosis be provided to support this finding?    Please select all that apply:    • Mild Protein Calorie Malnutrition  • Moderate Protein Calorie Malnutrition  • Severe Protein Calorie Malnutrition  • Cachexia  • Underweight  • Findings of no clinical significance  • Other explanation of clinical findings  • Unable to determine    The medical record reflects the following:   Clinical Findings BMI 16.7    2/5 Hosp PN: He appears malnourished, positive for weakness    RD note:  · BMI 16.7 (underweight classification)  · Appears very thin and frail  · Reports he eats well at home and has a good appetite  · Denied recent weight change, but chart review shows 18#(12.6%) weight loss in unknown specific time frame  · Patient is agreeable to snacks and reports he drinks supplements at home   Treatment Dietary consult  RD interventions/recommendations:  · Added snacks TID between meals and Boost Plus TID  · Nutrition rep to see patient daily for meals and snack preferences  · Encourage PO of meals, snacks and supplements  · Weekly weights to monitor fluid/nutrition status  · Continual documentation of PO intake as % consumed   Risk Factors Age  GLF  Lung mass  Hyponatremia   Dehydration    Location within medical record Progress Notes and Registered Dietitian note     Patient meets criteria for malnutrition when 2 or more characteristics are met:  Malnutrition in the context of: Acute Illness or Injury Chronic Illness    Moderate Severe Moderate Severe   Energy Intake <75% estimated energy req for >7 days  <50%estimated energy req for >5 days <75% estimated energy req for > 1 month <75% estimated energy req for >3 months   Weight Loss   (from baseline) 1 -2% over 1 wk  5% over 1 mo  7.5% over 3 mos >1 - 2% over 1 wk  >5%  over 1 mo  >7.5% over 3 mos 5% over 1 mo  7.5% over 3 mos  10% over 6 mos  20 over 1 year >5% over 1 mo  >7.5% over 3 mos  1>0% over 6 mos  20> over 1 year   Body Fat Loss  (Loss of SQ fat from the orbits, triceps or fat underlying the ribs) Mild Moderate Mild Severe   Muscle Loss  (Muscle wasting at the temples, clavicles, shoulders, interosseous spaces, scapula, thigh, calf) Mild Moderate Mild Severe   Fluid Accumulation  (Localized or generalized edema of the extremities, vulva, scrotum and/or weight loss masked by edema) Mild Moderate to Severe Mild Severe    Strength n/a Measurably reduced n/a Measurably reduced       Thank you,   Tram Foote, RN, BSN  Clinical   [phone]

## 2018-02-06 NOTE — PROGRESS NOTES
Pt lying in bed, watching tv. Bedside report completed, POC discussed with dayshift RN. Pt verbalizes understanding of POC. Pt does get confused, will update as needed. Bed low and locked, call light in reach. Pt voided in bed, will clean up now. No other needs at this time.

## 2018-02-06 NOTE — CARE PLAN
Problem: Respiratory:  Goal: Respiratory status will improve  Outcome: PROGRESSING AS EXPECTED  Pt on home oxygen baseline requirements. Denies SOB and dyspnea

## 2018-02-06 NOTE — PROGRESS NOTES
Bedside report received assumed pt care @0700. Pt A&Ox3. He c/o constant pain in ribs. POC discussed, he verbalized understanding. Pt states he is adamant about going home today. Call light within reach

## 2018-02-06 NOTE — THERAPY
"Physical Therapy Evaluation completed.   Bed Mobility:  Supine to Sit:  (up in chair)  Transfers: Sit to Stand: Contact Guard Assist  Gait: Level Of Assist: Stand by Assist (is cg assist when walking with spc.) with Front-Wheel Walker       Plan of Care: Will benefit from Physical Therapy 2 times per week  Discharge Recommendations: Equipment: No Equipment Needed. Post-acute therapy Discharge to a transitional care facility for continued skilled therapy services vs Discharge to home with outpatient or home health for additional skilled therapy services, depending on confirmation of just this one fall vs reports of many falls. Today, pt reports \"only this one fall\". If history is accurate, and pt has had many falls, he needs to go to SNF to improve balance and endurance with use of FWW.     Pt presents s/p GLF and possible h/o multiple falls. Today, pt agreed to walk using SPC and a trial using FWW. Pt is safer when using FWW and was agreeable to using his FWW at his group home instead of SPC they way he has been lately. Pt struggles with sit-stand, but able when given time. Pt will benefit from inpt PT to address problems and assist with d/c plan.   .    See \"Rehab Therapy-Acute\" Patient Summary Report for complete documentation.     "

## 2018-02-06 NOTE — DISCHARGE PLANNING
Medical Social Work    Per [employer] MD, pt will likely d/c today. [employer] attempting to get in touch with guardian.    No IMM to be given at this time, as pt has been here less than 48 hrs.

## 2018-02-06 NOTE — PROGRESS NOTES
Called MD to update on pt 10/10 pain. Pt coughing with broken rib. Pt groaning in excruciating pain. Pt takes 10/325 of percocet BID before fall. Anything for pain?    Orders received

## 2018-02-07 ENCOUNTER — PATIENT OUTREACH (OUTPATIENT)
Dept: HEALTH INFORMATION MANAGEMENT | Facility: OTHER | Age: 73
End: 2018-02-07

## 2018-02-07 PROBLEM — Z71.89 GOALS OF CARE, COUNSELING/DISCUSSION: Status: RESOLVED | Noted: 2018-02-06 | Resolved: 2018-02-07

## 2018-02-07 LAB — EKG IMPRESSION: NORMAL

## 2018-02-07 PROCEDURE — A9270 NON-COVERED ITEM OR SERVICE: HCPCS | Performed by: INTERNAL MEDICINE

## 2018-02-07 PROCEDURE — 700105 HCHG RX REV CODE 258: Performed by: STUDENT IN AN ORGANIZED HEALTH CARE EDUCATION/TRAINING PROGRAM

## 2018-02-07 PROCEDURE — 700102 HCHG RX REV CODE 250 W/ 637 OVERRIDE(OP): Performed by: STUDENT IN AN ORGANIZED HEALTH CARE EDUCATION/TRAINING PROGRAM

## 2018-02-07 PROCEDURE — A9270 NON-COVERED ITEM OR SERVICE: HCPCS | Performed by: STUDENT IN AN ORGANIZED HEALTH CARE EDUCATION/TRAINING PROGRAM

## 2018-02-07 PROCEDURE — 97165 OT EVAL LOW COMPLEX 30 MIN: CPT

## 2018-02-07 PROCEDURE — 700102 HCHG RX REV CODE 250 W/ 637 OVERRIDE(OP): Performed by: INTERNAL MEDICINE

## 2018-02-07 PROCEDURE — 93005 ELECTROCARDIOGRAM TRACING: CPT | Performed by: INTERNAL MEDICINE

## 2018-02-07 PROCEDURE — 93010 ELECTROCARDIOGRAM REPORT: CPT | Performed by: INTERNAL MEDICINE

## 2018-02-07 PROCEDURE — 302255 BARRIER CREAM MOISTURE BAZA PROTECT (ZINC) 5OZ: Performed by: INTERNAL MEDICINE

## 2018-02-07 PROCEDURE — 770020 HCHG ROOM/CARE - TELE (206)

## 2018-02-07 PROCEDURE — G8988 SELF CARE GOAL STATUS: HCPCS | Mod: CI

## 2018-02-07 PROCEDURE — G8987 SELF CARE CURRENT STATUS: HCPCS | Mod: CK

## 2018-02-07 PROCEDURE — 700111 HCHG RX REV CODE 636 W/ 250 OVERRIDE (IP): Performed by: INTERNAL MEDICINE

## 2018-02-07 PROCEDURE — 99232 SBSQ HOSP IP/OBS MODERATE 35: CPT | Mod: GC | Performed by: INTERNAL MEDICINE

## 2018-02-07 RX ORDER — ERGOCALCIFEROL 1.25 MG/1
50000 CAPSULE ORAL
Qty: 15 CAP | Refills: 0 | Status: SHIPPED | OUTPATIENT
Start: 2018-02-14 | End: 2021-09-10

## 2018-02-07 RX ORDER — OXYCODONE AND ACETAMINOPHEN 10; 325 MG/1; MG/1
1 TABLET ORAL 2 TIMES DAILY PRN
Status: DISCONTINUED | OUTPATIENT
Start: 2018-02-07 | End: 2018-02-08 | Stop reason: HOSPADM

## 2018-02-07 RX ORDER — DONEPEZIL HYDROCHLORIDE 5 MG/1
5 TABLET, FILM COATED ORAL DAILY
Qty: 30 TAB | Refills: 0 | Status: SHIPPED | OUTPATIENT
Start: 2018-02-07 | End: 2021-09-10

## 2018-02-07 RX ORDER — ERGOCALCIFEROL 1.25 MG/1
50000 CAPSULE ORAL
Status: DISCONTINUED | OUTPATIENT
Start: 2018-02-07 | End: 2018-02-08 | Stop reason: HOSPADM

## 2018-02-07 RX ORDER — ACETAMINOPHEN 325 MG/1
650 TABLET ORAL EVERY 8 HOURS PRN
Qty: 60 TAB | Refills: 0 | Status: SHIPPED | OUTPATIENT
Start: 2018-02-07 | End: 2021-09-10

## 2018-02-07 RX ADMIN — NICOTINE 21 MG: 21 PATCH, EXTENDED RELEASE TRANSDERMAL at 05:43

## 2018-02-07 RX ADMIN — ACETAMINOPHEN 650 MG: 325 TABLET, FILM COATED ORAL at 20:59

## 2018-02-07 RX ADMIN — OXYCODONE HYDROCHLORIDE AND ACETAMINOPHEN 1 TABLET: 10; 325 TABLET ORAL at 09:51

## 2018-02-07 RX ADMIN — OXYCODONE HYDROCHLORIDE AND ACETAMINOPHEN 1 TABLET: 10; 325 TABLET ORAL at 03:04

## 2018-02-07 RX ADMIN — STANDARDIZED SENNA CONCENTRATE AND DOCUSATE SODIUM 2 TABLET: 8.6; 5 TABLET, FILM COATED ORAL at 20:59

## 2018-02-07 RX ADMIN — ERGOCALCIFEROL 50000 UNITS: 1.25 CAPSULE ORAL at 09:43

## 2018-02-07 RX ADMIN — ENOXAPARIN SODIUM 30 MG: 100 INJECTION SUBCUTANEOUS at 09:44

## 2018-02-07 RX ADMIN — MIRTAZAPINE 15 MG: 15 TABLET, FILM COATED ORAL at 20:59

## 2018-02-07 RX ADMIN — PAROXETINE HYDROCHLORIDE 20 MG: 10 TABLET, FILM COATED ORAL at 09:43

## 2018-02-07 RX ADMIN — STANDARDIZED SENNA CONCENTRATE AND DOCUSATE SODIUM 2 TABLET: 8.6; 5 TABLET, FILM COATED ORAL at 09:44

## 2018-02-07 RX ADMIN — SODIUM CHLORIDE: 9 INJECTION, SOLUTION INTRAVENOUS at 01:08

## 2018-02-07 RX ADMIN — TIOTROPIUM BROMIDE 1 CAPSULE: 18 CAPSULE ORAL; RESPIRATORY (INHALATION) at 09:43

## 2018-02-07 RX ADMIN — OXYCODONE HYDROCHLORIDE AND ACETAMINOPHEN 1 TABLET: 10; 325 TABLET ORAL at 18:21

## 2018-02-07 RX ADMIN — DONEPEZIL HYDROCHLORIDE 5 MG: 5 TABLET, FILM COATED ORAL at 20:59

## 2018-02-07 ASSESSMENT — ENCOUNTER SYMPTOMS
WEAKNESS: 1
SORE THROAT: 0
BLURRED VISION: 0
CONSTIPATION: 0
SHORTNESS OF BREATH: 0
NERVOUS/ANXIOUS: 0
ABDOMINAL PAIN: 0
WHEEZING: 0
SPEECH CHANGE: 0
FOCAL WEAKNESS: 0
COUGH: 0
VOMITING: 0
FEVER: 0
DIZZINESS: 0
HEARTBURN: 0
NAUSEA: 0
DIARRHEA: 0

## 2018-02-07 ASSESSMENT — COGNITIVE AND FUNCTIONAL STATUS - GENERAL
SUGGESTED CMS G CODE MODIFIER DAILY ACTIVITY: CK
DRESSING REGULAR UPPER BODY CLOTHING: A LITTLE
DRESSING REGULAR LOWER BODY CLOTHING: A LOT
HELP NEEDED FOR BATHING: A LOT
TOILETING: A LITTLE
DAILY ACTIVITIY SCORE: 18

## 2018-02-07 ASSESSMENT — PAIN SCALES - GENERAL
PAINLEVEL_OUTOF10: 10

## 2018-02-07 ASSESSMENT — ACTIVITIES OF DAILY LIVING (ADL): TOILETING: INDEPENDENT

## 2018-02-07 NOTE — THERAPY
"Occupational Therapy Evaluation completed.   Functional Status: Mod A supine > EOB, CGA stand-pivot with FWW, mod A LB dressing, min A UB dressing    Plan of Care: Will benefit from Occupational Therapy 3 times per week  Discharge Recommendations:  Equipment: Will Continue to Assess for Equipment Needs. Post-acute therapy Discharge to a transitional care facility for continued skilled therapy services. (Pt will refuse placement. Next best option would be HH).     See \"Rehab Therapy-Acute\" Patient Summary Report for complete documentation.    72 y.o. male with h/o ETOH abuse, CVA, anemia, COPD, GERD, who had GLF at group home. Pt hypotensive in ED. Pt hydrated and dx with hyponatremia and leukocytosis. Seen now for OT eval. Pt completed supine > EOB, amb to bedside chair with FWW, UB/LB dressing. Pt required mod A for bed mobility, mod A for LB dressing due to report of extreme R rib pain. Pt appears very uncomfortable, hollers \"it hurts!\" frequently throughout session. Pt had condom cath on. RN agreed to have it removed for LB dressing and likely DC home. OT attempted to roll condom off penis, however pt refused due to pain from pulling on hairs. Educated pt on pillow splinting during coughing. Pt irritable throughout session, very eager to DC home to resume smoking. Pt is at risk of further falls due to balance inpairment, pain and poor safety awareness. He did agree that FWW is better option than SPC at this time. Pt is below baseline for basic self-care due to rib pain. Post-acute transitional care setting would be beneficial, however pt will likely need additional help until rib pain becomes better controlled. He also adamantly refuses SNF placement. HH OT would be next best option. Acute OT to follow while in-house.     "

## 2018-02-07 NOTE — PROGRESS NOTES
Internal Medicine Interval Note  Note Author: ZAINAB Hong.     Name Jose Armando Wu     1945   Age/Sex 72 y.o. male   MRN [id number]   Code Status FULL     After 5PM or if no immediate response to page, please call for cross-coverage  Attending/Team: Dada/ See Patient List for primary contact information  Call [phone] to page    1st Call - Day Intern (R1):   Sarah Vazquez   2nd Call - Day Sr. Resident (R2/R3):            Reason for interval visit  (Principal Problem)   Fall from ground level    Interval Problem Daily Status Update  (24 hours)   -Patient denies any acute complaints  -The patient was in normal sinus rhythm last night.  -He is refusing to go to SNF as recommended by the PT and the care giver was present at the time of rounds. The patient and the guardian were explained regarding the discharge plan.       Review of Systems   Constitutional: Negative for chills and fever.   HENT: Negative for congestion and sore throat.    Eyes: Negative for blurred vision and double vision.   Respiratory: Negative for cough and shortness of breath.    Cardiovascular: Negative for palpitations and orthopnea.   Gastrointestinal: Negative for abdominal pain, heartburn, nausea and vomiting.   Genitourinary: Negative for dysuria and flank pain.   Musculoskeletal: Negative for myalgias.   Neurological: Positive for weakness. Negative for dizziness, focal weakness, loss of consciousness and headaches.   Psychiatric/Behavioral: The patient is not nervous/anxious.        Consultants/Specialty  Geriatrics    Disposition  Patient will be discharged with the Home health.     Quality Measures    Reviewed items::  EKG reviewed, Radiology images reviewed, Labs reviewed and Medications reviewed  DVT prophylaxis pharmacological::  Enoxaparin (Lovenox)  Ulcer Prophylaxis::  Yes          Physical Exam       Vitals:    18 0733 18 1144 18 1504 18 1901   BP: 117/83  109/68 112/88 127/86   Pulse: 77 97 95 100   Resp: 18 18 16 16   Temp: 37.2 °C (98.9 °F) 36.4 °C (97.6 °F) 36.8 °C (98.2 °F) 36.3 °C (97.4 °F)   SpO2: 93% 94% 94% 93%   Weight:    57.4 kg (126 lb 8.7 oz)   Height:         Body mass index is 17.16 kg/m². Weight: 57.4 kg (126 lb 8.7 oz)  Oxygen Therapy:  Pulse Oximetry: 93 %, O2 (LPM): 0, O2 Delivery: None (Room Air)    Physical Exam   Constitutional: He is oriented to person, place, and time. No distress.   HENT:   Mouth/Throat: Oropharynx is clear and moist. No oropharyngeal exudate.   Eyes: Conjunctivae and EOM are normal. Pupils are equal, round, and reactive to light.   Neck: Normal range of motion. Neck supple.   Cardiovascular: Normal rate and regular rhythm.    Pulmonary/Chest: Effort normal and breath sounds normal. No respiratory distress.   Abdominal: Soft. Bowel sounds are normal.   Musculoskeletal: Normal range of motion.   Neurological: He is alert and oriented to person, place, and time. No cranial nerve deficit.   Skin: Skin is warm and dry. He is not diaphoretic.   Nursing note and vitals reviewed.        Lab Data Review:         2/6/2018  6:42 PM    Recent Labs      02/04/18 1951 02/05/18 0327 02/06/18   0257   SODIUM  127*  134*  127*   POTASSIUM  3.7  3.9  3.6   CHLORIDE  99  108  98   CO2  23  22  23   BUN  7*  7*  5*   CREATININE  0.72  0.56  0.64   CALCIUM  8.0*  8.3*  7.8*       Recent Labs      02/04/18 1951 02/05/18 0327 02/06/18   0257   ALTSGPT  27  21   --    ASTSGOT  36  27   --    ALKPHOSPHAT  68  60   --    TBILIRUBIN  0.5  0.5   --    GLUCOSE  82  100*  89       Recent Labs      02/04/18 1951 02/05/18 0327 02/06/18   0257   RBC  3.85*  3.79*  3.93*   HEMOGLOBIN  12.4*  12.1*  12.6*   HEMATOCRIT  36.5*  36.0*  36.2*   PLATELETCT  213  216  230   PROTHROMBTM  14.1   --    --    APTT  28.1   --    --    INR  1.12   --    --        Recent Labs      02/04/18   1951 02/05/18   0327  02/06/18   0257   WBC  15.4*  12.4*   15.9*   NEUTSPOLYS  64.00  39.10*  48.70   LYMPHOCYTES  31.60  56.50*  44.30*   MONOCYTES  3.50  2.60  1.70   EOSINOPHILS  0.00  0.00  0.00   BASOPHILS  0.00  0.00  0.90   ASTSGOT  36  27   --    ALTSGPT  27  21   --    ALKPHOSPHAT  68  60   --    TBILIRUBIN  0.5  0.5   --            Assessment/Plan     * Fall from ground level- (present on admission)   Assessment & Plan    -72 years old male presented after a unwitnessed GLF on admission with symptoms of dizziness,lightheadedness prior to the fall. Denies any prodromal symptoms of diaphoresis,palpitations  -No history of previous falls.  -Patient ambulates with a cane when required to use a walker per guardian.  -admitted to telemetry.  -ECHO showed EF 55%.Previous Echo in 2016 showed LVEF 60-65%  -D dimer is ordered to rule out Pulmonary embolism  -IVF @ 100 ml/Hr  -Differential diagnosis : Polypharmacy, cardiac etiology, Orthostatic hypotension,pulmonary embolism,Metastasis of primary lung cancer, chronic pain in bilateral hips s/p replacement.  -Consulted geriatrics.Appreciate Rec's.                    Goals of care, counseling/discussion   Assessment & Plan    -Patient has h/o dementia with possible behavioral issues.   -He is at bedside able to understand basics of his medical illness. He has a guardian and lives in a group home. He currently just wants to get out of the hospital and does not want to go to any facility. He understands his poor choices and backs that decision. He will need further f/u and discussion re Lung mass, Leucocytosis, Falls in an outpatient setting. At this time patient adamant about leaving the hospital.   -Patient refuses further evaluation of the lung mass, SNF placement and Is to be discharged with Home health        Lung mass- (present on admission)   Assessment & Plan    -Previous imaging studies showed 5 x 11.7 mm spiculated nodule right lung apex similar to findings in 3/26/2015  -Scattered atelectasis/fibrosis and mild  mediastinal,hilar adenopathy.  -Follow up with pulmonary for biopsy or resection of the mass Is done.  -Patient refused any interventions of the mass.             Hyponatremia- (present on admission)   Assessment & Plan    -On admission CMP showed Na of 127,Repeat after IVF is 134. Likely secondary to dehydration  -History of Lung mass 5.3 mm spiculated mass in the right apex   Plan:   - Will continue to monitor            Chest pain- (present on admission)   Assessment & Plan    -Patient has symptoms of chest pain located on the right side   -Ribs xr showed possible subtle anterior lateral ninth rib fracture   Plan:   - PRN tylenol for pain.  -Percocet prn        Leukocytosis- (present on admission)   Assessment & Plan    -WBC on admission is 15.4, repeat after NS 1000 ml is 12.4   -likely secondary to dehydration  -no source of infection and vitals are stable.  -Will Continue to monitor  -Diff abnormal with smudge cells and basophils, PS suggest large platelets. Hb and platelet counts ok. Will need re evaluation in two weeks again if the changes are persistent. Patient does not want any investigations. Will d/w guardian regarding goals of care.

## 2018-02-07 NOTE — DISCHARGE PLANNING
Medical Social Work    Received update that pt/guardian want [facility]- not [facility]. Choice sent to [facility].

## 2018-02-07 NOTE — FACE TO FACE
Face to Face Supporting Documentation - Home Health    The encounter with this patient was in whole or in part the primary reason for home health admission.    Date of encounter:   Patient:                    MRN:                       YOB: 2016  Jose Armando Wu  [id number]  1945     Home health to see patient for:  Wound Care, Home health aide and Physical Therapy evaluation and treatment    Skilled need for:  Comment: Ground level fall and deconditioning    Skilled nursing interventions to include:  Comment: Physcial therapy Medications     Homebound status evidenced by:  Need the aid of supportive devices such as crutches, canes, wheelchairs or walkers. Leaving home requires a considerable and taxing effort. There is a normal inability to leave the home.    Community Physician to provide follow up care: SHERLEY Santos     Optional Interventions? No      I certify the face to face encounter for this home health care referral meets the CMS requirements and the encounter/clinical assessment with the patient was, in whole, or in part, for the medical condition(s) listed above, which is the primary reason for home health care. Based on my clinical findings: the service(s) are medically necessary, support the need for home health care, and the homebound criteria are met.  I certify that this patient has had a face to face encounter by myself.  RICK HongS. - NPI: [id number]

## 2018-02-07 NOTE — DISCHARGE PLANNING
Transitional Care Navigator:    Met with the patient regarding choice for home health f/u.  Pt is agreeable to [facility]; he has been in service with them previously.  Choice form completed and faxed; SW is aware.

## 2018-02-07 NOTE — DISCHARGE PLANNING
ANTONY spoke with Sherry, pt's guardian. Sherry apologized for not returning calls because she was in court. Sherry informed plan for pt is SNF vs HH and that PT concerned about reports of many falls, but pt reports only one fall; if pt has may falls, then he needs to go to SNF to improve balance and endurance.      Per Sherry, pt has not has many falls. ANTONY spoke with RN and relayed to Sherry that pt has old healed rib fractures from previous falls. Sherry surprised, but reiterated pt has not had many falls to her knowledge. ANTONY asked pt of his supports at [facility]. Per Sherry, pt has 2 caregivers and these 2 caregivers also attend to five other patients and that pt often switches cane to FWW. RN also stated pt noncompliant with FWW. ANTONY asked Sherry if these two caregivers can monitor pt and ensure pt uses FWW to prevent falls; if not, pt should d/c to SNF. Per Sherry, recommends Home Health for pt and will speak with caregivers about FWW for pt and would recommend they remove cane from pt so pt can only use FWW and to inform pt MD recommends FWW. Sherry further stated if pt wasn't ambulating and MD recommends SNF for pt then pt should go to SNF, but she is leaning towards HH.     ANTONY spoke with RENATO CHU. RENATO CHU informed of above. Per RENATO CHU, feels its best for pt to go to SNF, but will put in HH for pt and pt also stated he wants to return home. ANTONY recommended RENATO CHU speak with Sherry. Per RENATO CHU, has Sherry's number and will contact her.

## 2018-02-07 NOTE — PROGRESS NOTES
Report received. Care assumed. Patient A&Ox3. Pt reports feeling 0/10 pain, no SOB at this time.  Pt currently has safety sitter at bedside.Discussed POC for the day with Pt. Call light within reach, encouraged pt to call for assistance.

## 2018-02-07 NOTE — PROGRESS NOTES
"    [facility] ATTENDING NOTE:      Date & Time note created:   2/6/2018   5:36 PM     Visit Time:   Attending/resident bedside rounds 9-11:30 AM    The patient was evaluated with the resident staff. I reviewed the resident's note and agree with the resident's findings and plan as documented in the resident's note except as documented in the attending note. Please reference resident daily note for complete information.The chart was reviewed and summarized. Available labs, imaging, O2 sats, EKGs were reviewed. Available nursing, consultant and resident notes were reviewed. I am actively involved in the patient's care.                                                                BRIEF DISCUSSION:                                                         Patient is refusing SNF placement. During AM rounds he seemed like he was able to process medical information and knew the reasons for SNF recommedation but still did not want to comply with it. He said I know that I could fall but \"I am stubborn and do not want to go to another facilty\". He understood his options of HH or continued inpatient monitoring. Will coordinate with the guardian and plan appropriate discharge plan. Plan on HH with discharge to his group home. Spoke to care giver as well.     Active Hospital Problems    Diagnosis   • Fall from ground level [W18.30XA]     Priority: High   • Leukocytosis [D72.829]     Priority: High   • Chest pain [R07.9]     Priority: High   • Hyponatremia [E87.1]     Priority: High   • Lung mass [R91.8]     Priority: High   • History of deep vein thrombosis (DVT) of lower extremity [Z86.718]     Priority: Medium     Off AC since 7/2014         Vitals:    02/06/18 0408 02/06/18 0733 02/06/18 1144 02/06/18 1504   BP: 104/70 117/83 109/68 112/88   Pulse: 74 77 97 95   Resp: 18 18 18 16   Temp: 37 °C (98.6 °F) 37.2 °C (98.9 °F) 36.4 °C (97.6 °F) 36.8 °C (98.2 °F)   SpO2: 94% 93% 94% 94%   Weight:       Height:     "     Weight/BMI: Body mass index is 16.83 kg/m².  Pulse Oximetry: 94 %, O2 (LPM): 0, O2 Delivery: None (Room Air)    Intake/Output Summary (Last 24 hours) at 02/06/18 1736  Last data filed at 02/06/18 0900   Gross per 24 hour   Intake              720 ml   Output             2625 ml   Net            -1905 ml       Carlos Farias MD   [facility] Hospitalist

## 2018-02-07 NOTE — DISCHARGE PLANNING
Medical Social Work    HH referral can't be sent until HH order is in. ANTONY paged [facility].     Order for FWW is in- pt already has walker at home.     ANTONY will call guardian, Sherry, once [facility] responds.

## 2018-02-07 NOTE — PROGRESS NOTES
Face to Face Supporting Documentation - Home Health    The encounter with this patient was in whole or in part the primary reason for home health admission.    Date of encounter:   Patient:                    MRN:                       YOB: 2016  Jose Armando Wu  [id number]  1945     Home health to see patient for:  Wound Care, Home health aide and Physical Therapy evaluation and treatment    Skilled need for:  Comment: Ground level fall and deconditioning    Skilled nursing interventions to include:  Wound Care and Comment: Physical therapy     Homebound evidenced status by:  Need the aid of supportive devices such as crutches, canes, wheelchairs or walkers or Needs the assistance of another person in order to leave the home. Leaving home must require a considerable and taxing effort. There must exist a normal inability to leave the home.    Community Physician to provide follow up care: SHERLEY Santos     Optional Interventions    Wound information & treatment:    Home Infusion Therapy orders:    Line/Drain/Airway:    I certify the face to face encounter for this home care referral meets the CMS requirements and the encounter/clinical assessment with the patient was, in whole, or in part, for the medical condition(s) listed above, which is the primary reason for home health care. Based on my clinical findings: the service(s) are medically necessary, support the need for home health care, and the homebound criteria are met.  I certify that this patient has had a face to face encounter by myself.  RICK HongS. - NPI: [id number]    *Debility, frailty and advanced age in the absence of an acute deterioration or exacerbation of a condition do not qualify a patient for home health.

## 2018-02-07 NOTE — DISCHARGE SUMMARY
Internal Medicine Discharge Summary  Note Author: Rosalinda Petersen M.D.       Admit Date:  2/4/2018       Discharge Date:   02/07/18    Service:   [facility] Internal Medicine Yellow Team  Attending Physician(s):   Dr Farias       Senior Resident(s):   Dr Petersen   Enmanuel Resident(s):   Dr Vazquez      Primary Diagnosis:   Ground level fall  Presyncope due to orthostatic hypotension    Secondary Diagnoses:                Principal Problem:    Fall from ground level POA: Yes  Active Problems:    Leukocytosis POA: Yes    Chest pain POA: Yes    Hyponatremia POA: Yes    Lung mass POA: Yes    History of deep vein thrombosis (DVT) of lower extremity POA: Unknown      Overview: Off AC since 7/2014  Resolved Problems:    Goals of care, counseling/discussion POA: Unknown      Hospital Summary (Brief Narrative):       A 73 yo M with dementia (under care of a guardian), CVA, COPD, known lung mass suspicious for malignancy, brought in after a fall with lightheadedness prior to fall. He was watching superbowl, got up and fell down. He needs to use a walker, but he has been only using a cane. Denied loss of consciousness, nausea, vomiting, diarrhea, palpitation, chest pain and weakness. He lives in a group home and has care givers. He was noted to have orthostatic symptoms at ER, was given IVF with resolution of his symptoms. PT/OT eval was done, recommended SNF, but patient was adamant about going back to his facility. We discussed with his guardian and caregiver, decided to discharge him on homehealth with physical therapy since he will most likely do better on his familiar surrounding rather than SNF which he has been refusing. Caregiver is also gonna remove his cane and ensure to use FWW. Home health referral has been sent to [facility] which comes to his facility.   Due to falls and polypharmacy, inpatient geriatrics was consulted. Geriatrician spoke to caregiver and recommended to wean his risperdal, mirtazapine and paroxetine and  to wean one medication at a time. We have stopped risperal for now. If no significant behavioral issue, can stop mirtazapine for one week then if no change cut paroxetine in half, wait two weeks and then if clinically stable stop and monitor. We have also decreased of donepezil to 5 mg daily from 10 mg daily due to unclear utility in vascular dementia per geriatrician recommendations. Vitamin D was also checked, which was very low and started supplementation with high dose.    We discussed with his guardian about his lung mass, goal of care is still the same as in the past which is no further intervention or biopsy at this time. He is also noted to have leucocytosis since 2014 with increased lymphocytes and basophils without current symptoms and signs of infection. 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 He needs to follow up with PCP as outpatient with regular CBC, if it worsens or he becomes symptomatic, consider referral to hematology.     Patient /Hospital Summary (Details -- Problem Oriented) :          * Fall from ground level   Assessment & Plan    -72 years old male presented after a unwitnessed GLF on admission with symptoms of dizziness,lightheadedness prior to the fall. Denies any prodromal symptoms of diaphoresis,palpitations, SOB or chest pain.  -Patient ambulates with a cane when required to use a walker per guardian.  - telemetry showed SR-ST up to 100s.  -ECHO showed EF 55%, mild AS unchanged from 2016.  -Differential diagnosis : Polypharmacy, Orthostatic hypotension  -Consulted geriatrics.Appreciate Rec's. Recommended to taper his donepezil, anitpsychotics and SSRIs gradually as tolerated.                    Goals of care, counseling/discussion-resolved as of 2/7/2018   Assessment & Plan    -Patient has h/o dementia with possible behavioral issues.   -He is at bedside able to understand basics of his medical illness. He has a guardian and lives in a group home.  He currently just wants to get out of the hospital and does not want to go to any facility. He understands his poor choices and backs that decision. He will need further f/u and discussion re Lung mass, Leucocytosis, Falls in an outpatient setting. At this time patient adamant about leaving the hospital.   -Patient refuses further evaluation of the lung mass, SNF placement and Is to be discharged with Home health        Lung mass   Assessment & Plan    -Previous imaging studies showed 5 x 11.7 mm spiculated nodule right lung apex similar to findings in 3/26/2015  -Scattered atelectasis/fibrosis and mild mediastinal,hilar adenopathy.  -Patient refused any interventions of the mass. Discussed with guardian about this, it has been known to her as well and does not want aggressive measure given overall prognosis with dementia.             Hyponatremia   Assessment & Plan    -On admission CMP showed Na of 127,Repeat after IVF is 134. Likely secondary to dehydration or due to known lung mass.  -History of Lung mass 5.3 mm spiculated mass in the right apex   -continue to monitor as outpatient.            Chest pain   Assessment & Plan    -Patient has symptoms of chest pain located on the right side   -Ribs xr showed possible subtle anterior lateral ninth rib fracture   Plan:   - home Percocet bid. Hold further dose increase due to increase risk for fall. PRN tylenol for pain if needed. Patient is agreeable with the plan.        Leukocytosis   Assessment & Plan    -WBC on admission is 15.4, differential abnormal with smudge cells and basophils, PS suggest large platelets. There's mild anemia appropriate for his age, no thrombocytopenia. 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   - Given patient's overall prognosis, wish for non aggressive measure and indolent nature of CLL, no further evaluation recommended as this time.   - monitor with outpatient CBC and referral to hematology if he develops significant  thrombocytopenia, symptomatic anemia. Rediscuss with guardian for goals of care.            Consultants:     Geriatrics (Dr Walters)  Physical therapy  Occupational thearpy    Procedures:        none    Imaging/ Testing:      ECHOCARDIOGRAM COMP W/O CONT   Final Result   Compared to the report of the study done on 05/21/2015 there has been,   no significant change.  Mild aortic stenosis.  Left ventricular ejection fraction is visually estimated to be 55%.  Estimated right ventricular systolic pressure  is 40 mmHg.   DX-CHEST-LIMITED (1 VIEW)   Final Result         1.  No acute cardiopulmonary disease.   2.  Atherosclerosis      [id number] (WITH 1-VIEW CXR) RIGHT   Final Result         1.  Possible subtle anterior lateral ninth rib fracture.   2.  Atherosclerosis      CT-HEAD W/O   Final Result         1.  No acute intracranial abnormality is identified, there are changes of small vessel ischemic disease with mild atrophy noted.   2.  Atherosclerosis.            Discharge Medications:         Medication Reconciliation: Completed     Jose Armando Wu   [facility] Medication Instructions [facility] A:[id number]    Printed on:02/07/18 6877   Medication Information                      acetaminophen (TYLENOL) 325 MG Tab  Take 2 Tabs by mouth every 8 hours as needed (Mild Pain; (Pain scale 1-3); Temp greater than 100.5 F).             cyanocobalamin (VITAMIN B-12) 100 MCG TABS  Take 100 mcg by mouth every day.             docusate sodium (COLACE) 100 MG Cap  Take 200 mg by mouth every day.             donepezil (ARICEPT) 5 MG Tab  Take 1 Tab by mouth every day.             erythromycin 5 MG/GM Ointment  Place  in both eyes every evening.             gabapentin (NEURONTIN) 300 MG CAPS  Take 900 mg by mouth 3 times a day.             mirtazapine (REMERON) 15 MG TABS  Take 15 mg by mouth every evening.             oxyCODONE-acetaminophen (PERCOCET-10)  MG Tab  Take 1 Tab by mouth 2 Times a Day.             PARoxetine (PAXIL) 20  MG Tab  Take 20 mg by mouth every day.             tiotropium (SPIRIVA) 18 MCG CAPS  Inhale 18 mcg by mouth every day.             vitamin D, Ergocalciferol, (DRISDOL) 35235 units Cap capsule  Take 1 Cap by mouth every 7 days.                   Disposition:   Back to assisted living facility    Diet:   regular    Activity:   Use FWW    Instructions:         The patient was instructed to return to the ER in the event of worsening symptoms. I have counseled the patient on the importance of compliance and the patient has agreed to proceed with all medical recommendations and follow up plan indicated above.   The patient understands that all medications come with benefits and risks. Risks may include permanent injury or death and these risks can be minimized with close reassessment and monitoring.        Primary Care Provider:    SHERLEY Santos    Discharge summary faxed to primary care provider:  Completed  Copy of discharge summary given to the patient: Completed      Follow up appointment details :      Called hospital  to arrange appointment with PCP within a week, not available at the time of finishing discharge summary. Patient / caregiver should receive a call for confirmation of appointment.    Pending Studies:        none    Time spent on discharge day patient visit, preparing discharge paperwork and arranging for patient follow up.    Summary of follow up issues:   - wean gabapentin, antidepressant and antipsychotic medications as tolerated  - monitor CBC regularly for worsening anemia and thrombocytopenia    Discharge Time (Minutes) :    60 mins  Hospital Course Type:  Inpatient Stay >2 midnights      Condition on Discharge    ______________________________________________________________________    Interval history/exam for day of discharge:     Patient said he's feeling great. Ready to go back to his facility. Denies any compliant apart from willingness to go home    Vitals:    02/06/18  2300 02/07/18 0318 02/07/18 0802 02/07/18 1247   BP: 104/64 119/80 117/84 113/90   Pulse: (!) 115 (!) 106 (!) 107 82   Resp: 20 18 16 18   Temp: 37.3 °C (99.2 °F) 37.2 °C (99 °F) 36.6 °C (97.8 °F) 36.6 °C (97.9 °F)   SpO2: 95% 93% 92% 92%   Weight:       Height:         Weight/BMI: Body mass index is 17.16 kg/m².  Pulse Oximetry: 92 %, O2 (LPM): 0, O2 Delivery: None (Room Air)    General: NAD  CVS: S1, S2, AS murmur  PULM: CTA bilaterally, tender on right lower rib  ABD: soft, non tender, BS +  EXT: no edema    Most Recent Labs:    Lab Results   Component Value Date/Time    WBC 15.9 (H) 02/06/2018 02:57 AM    RBC 3.93 (L) 02/06/2018 02:57 AM    HEMOGLOBIN 12.6 (L) 02/06/2018 02:57 AM    HEMATOCRIT 36.2 (L) 02/06/2018 02:57 AM    MCV 92.1 02/06/2018 02:57 AM    MCH 32.1 02/06/2018 02:57 AM    MCHC 34.8 02/06/2018 02:57 AM    MPV 8.9 (L) 02/06/2018 02:57 AM    NEUTSPOLYS 48.70 02/06/2018 02:57 AM    LYMPHOCYTES 44.30 (H) 02/06/2018 02:57 AM    MONOCYTES 1.70 02/06/2018 02:57 AM    EOSINOPHILS 0.00 02/06/2018 02:57 AM    BASOPHILS 0.90 02/06/2018 02:57 AM    HYPOCHROMIA 1+ 10/14/2013 05:27 AM    ANISOCYTOSIS 1+ 02/06/2018 02:57 AM      Lab Results   Component Value Date/Time    SODIUM 127 (L) 02/06/2018 02:57 AM    POTASSIUM 3.6 02/06/2018 02:57 AM    CHLORIDE 98 02/06/2018 02:57 AM    CO2 23 02/06/2018 02:57 AM    GLUCOSE 89 02/06/2018 02:57 AM    BUN 5 (L) 02/06/2018 02:57 AM    CREATININE 0.64 02/06/2018 02:57 AM      Lab Results   Component Value Date/Time    ALTSGPT 21 02/05/2018 03:27 AM    ASTSGOT 27 02/05/2018 03:27 AM    ALKPHOSPHAT 60 02/05/2018 03:27 AM    TBILIRUBIN 0.5 02/05/2018 03:27 AM    ALBUMIN 3.3 02/05/2018 03:27 AM    GLOBULIN 1.6 (L) 02/05/2018 03:27 AM    INR 1.12 02/04/2018 07:51 PM    MACROCYTOSIS 1+ 02/06/2018 02:57 AM     Lab Results   Component Value Date/Time    PROTHROMBTM 14.1 02/04/2018 07:51 PM    INR 1.12 02/04/2018 07:51 PM

## 2018-02-07 NOTE — CARE PLAN
Problem: Knowledge Deficit  Goal: Knowledge of disease process/condition, treatment plan, diagnostic tests, and medications will improve  Outcome: PROGRESSING SLOWER THAN EXPECTED  Pt educated regarding activity, diet, meds and plan of care. Verbalized understanding.

## 2018-02-07 NOTE — ASSESSMENT & PLAN NOTE
-Patient has h/o dementia with possible behavioral issues.   -He is at bedside able to understand basics of his medical illness. He has a guardian and lives in a group home. He currently just wants to get out of the hospital and does not want to go to any facility. He understands his poor choices and backs that decision. He will need further f/u and discussion re Lung mass, Leucocytosis, Falls in an outpatient setting. At this time patient adamant about leaving the hospital.   -Patient refuses further evaluation of the lung mass, SNF placement and Is to be discharged with Home health

## 2018-02-07 NOTE — PROGRESS NOTES
Assumed care at 0700, bedside report received from night shift RN. Patient is AOx3 with no signs of distress. Pt. Is ST 97 with ectopy on the monitor. Initial assessment completed, orders reviewed, call light within reach, bed alarm in use, and hourly rounding in place. POC addressed with patient, no additional questions at this time.

## 2018-02-07 NOTE — CARE PLAN
Problem: Safety  Goal: Will remain free from injury  Outcome: PROGRESSING AS EXPECTED  Patient has 1:1 safety sitter because he is at risk for a fall.  Lap belt in place, patient demonstrated ability to unbuckle belt on his own.

## 2018-02-08 VITALS
OXYGEN SATURATION: 93 % | HEART RATE: 128 BPM | WEIGHT: 127.43 LBS | TEMPERATURE: 98.7 F | BODY MASS INDEX: 17.26 KG/M2 | RESPIRATION RATE: 20 BRPM | HEIGHT: 72 IN | SYSTOLIC BLOOD PRESSURE: 108 MMHG | DIASTOLIC BLOOD PRESSURE: 65 MMHG

## 2018-02-08 PROCEDURE — 700102 HCHG RX REV CODE 250 W/ 637 OVERRIDE(OP): Performed by: INTERNAL MEDICINE

## 2018-02-08 PROCEDURE — 700111 HCHG RX REV CODE 636 W/ 250 OVERRIDE (IP): Performed by: INTERNAL MEDICINE

## 2018-02-08 PROCEDURE — A6206 CONTACT LAYER <= 16 SQ IN: HCPCS | Performed by: INTERNAL MEDICINE

## 2018-02-08 PROCEDURE — A9270 NON-COVERED ITEM OR SERVICE: HCPCS | Performed by: STUDENT IN AN ORGANIZED HEALTH CARE EDUCATION/TRAINING PROGRAM

## 2018-02-08 PROCEDURE — 700102 HCHG RX REV CODE 250 W/ 637 OVERRIDE(OP): Performed by: STUDENT IN AN ORGANIZED HEALTH CARE EDUCATION/TRAINING PROGRAM

## 2018-02-08 PROCEDURE — 99239 HOSP IP/OBS DSCHRG MGMT >30: CPT | Mod: GC | Performed by: INTERNAL MEDICINE

## 2018-02-08 PROCEDURE — A9270 NON-COVERED ITEM OR SERVICE: HCPCS | Performed by: INTERNAL MEDICINE

## 2018-02-08 RX ADMIN — OXYCODONE HYDROCHLORIDE AND ACETAMINOPHEN 1 TABLET: 10; 325 TABLET ORAL at 14:52

## 2018-02-08 RX ADMIN — PAROXETINE HYDROCHLORIDE 20 MG: 10 TABLET, FILM COATED ORAL at 09:12

## 2018-02-08 RX ADMIN — TIOTROPIUM BROMIDE 1 CAPSULE: 18 CAPSULE ORAL; RESPIRATORY (INHALATION) at 09:11

## 2018-02-08 RX ADMIN — NICOTINE 21 MG: 21 PATCH, EXTENDED RELEASE TRANSDERMAL at 05:27

## 2018-02-08 RX ADMIN — ENOXAPARIN SODIUM 30 MG: 100 INJECTION SUBCUTANEOUS at 09:12

## 2018-02-08 RX ADMIN — STANDARDIZED SENNA CONCENTRATE AND DOCUSATE SODIUM 2 TABLET: 8.6; 5 TABLET, FILM COATED ORAL at 09:11

## 2018-02-08 RX ADMIN — OXYCODONE HYDROCHLORIDE AND ACETAMINOPHEN 1 TABLET: 10; 325 TABLET ORAL at 05:28

## 2018-02-08 ASSESSMENT — PAIN SCALES - GENERAL
PAINLEVEL_OUTOF10: 10

## 2018-02-08 NOTE — CARE PLAN
Problem: Safety  Goal: Will remain free from injury  Outcome: PROGRESSING AS EXPECTED  Fall risk assessed every shift and fall precautions in place.  Pt educated to not get up without assistance.  Verbalized understanding.   Patient sundowns so safety sitter in place    Problem: Skin Integrity  Goal: Risk for impaired skin integrity will decrease  Outcome: PROGRESSING AS EXPECTED   Kye score completed every shift, pt turned every 2 hours, skin assessment complete, adequate nutrition encouraged.

## 2018-02-08 NOTE — WOUND TEAM
Attempted to see patient per consult order, patient refused. Patient has dressing in place to right arm skin tears, and refused removal of dressing and refused assessment of buttocks/sacral area. Discussed with staff RN Zaria. If patient is agreeable at a later time, nursing may re-consult wound care.

## 2018-02-08 NOTE — PROGRESS NOTES
Monitor Summary:    SR-ST :   F PAC  O PVC  2.9 sec episode of PSVT in the 200s    0.14/0.08/0.32

## 2018-02-08 NOTE — DISCHARGE SUMMARY
Internal Medicine Discharge Summary  Note Author: NEVILLE Hong       Admit Date:  2/4/2018       Discharge Date:   02/8/2018    Service:   R Internal Medicine Red Team  Attending Physician(s):          Senior Resident(s):     Enmanuel Resident(s):Sarah Vazquez        Primary Diagnosis:   Fall from ground level  Presyncope due to orthostatic hypotension    Secondary Diagnoses:                Principal Problem:    Fall from ground level POA: Yes  Active Problems:    Leukocytosis POA: Yes    Chest pain POA: Yes    Hyponatremia POA: Yes    Lung mass POA: Yes    History of deep vein thrombosis (DVT) of lower extremity POA: Unknown      Overview: Off AC since 7/2014  Resolved Problems:    Goals of care, counseling/discussion POA: Unknown      Hospital Summary (Brief Narrative):       A 71 yo M with dementia (under care of a guardian), CVA, COPD, known lung mass suspicious for malignancy, brought in after a fall with lightheadedness prior to fall. He was watching superbowl, got up and fell down. He needs to use a walker, but he has been only using a cane. Denied loss of consciousness, nausea, vomiting, diarrhea, palpitation, chest pain and weakness. He lives in a group home and has care givers. He was noted to have orthostatic symptoms at ER, was given IVF with resolution of his symptoms. PT/OT eval was done, recommended SNF, but patient was adamant about going back to his facility. We discussed with his guardian and caregiver, decided to discharge him on homehealth with physical therapy since he will most likely do better on his familiar surrounding rather than SNF which he has been refusing. Caregiver is also gonna remove his cane and ensure to use FWW. Home health referral has been sent to [facility] which comes to his facility.   Due to falls and polypharmacy, inpatient geriatrics was consulted. Geriatrician spoke to caregiver and recommended to wean his risperdal, mirtazapine and  paroxetine and to wean one medication at a time. We have stopped risperal for now. If no significant behavioral issue, can stop mirtazapine for one week then if no change cut paroxetine in half, wait two weeks and then if clinically stable stop and monitor. We have also decreased of donepezil to 5 mg daily from 10 mg daily due to unclear utility in vascular dementia per geriatrician recommendations. Vitamin D was also checked, which was very low and started supplementation with high dose.     We discussed with his guardian about his lung mass, goal of care is still the same as in the past which is no further intervention or biopsy at this time. He is also noted to have leucocytosis since 2014 with increased lymphocytes and basophils without current symptoms and signs of infection. 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 He needs to follow up with PCP as outpatient with regular CBC, if it worsens or he becomes symptomatic, consider referral to hematology.     Patient /Hospital Summary (Details -- Problem Oriented) :          * Fall from ground level   Assessment & Plan    -72 years old male presented after a unwitnessed GLF on admission with symptoms of dizziness,lightheadedness prior to the fall. Denies any prodromal symptoms of diaphoresis,palpitations, SOB or chest pain.  -Patient ambulates with a cane when required to use a walker per guardian.  - telemetry showed SR-ST up to 100s.  -ECHO showed EF 55%, mild AS unchanged from 2016.  -Differential diagnosis : Polypharmacy, Orthostatic hypotension  -Consulted geriatrics.Appreciate Rec's. Recommended to taper his donepezil, anitpsychotics and SSRIs gradually as tolerated.                    Goals of care, counseling/discussion-resolved as of 2/7/2018   Assessment & Plan    -Patient has h/o dementia with possible behavioral issues.   -He is at bedside able to understand basics of his medical illness. He has a guardian and lives  in a group home. He currently just wants to get out of the hospital and does not want to go to any facility. He understands his poor choices and backs that decision. He will need further f/u and discussion re Lung mass, Leucocytosis, Falls in an outpatient setting. At this time patient adamant about leaving the hospital.   -Patient refuses further evaluation of the lung mass, SNF placement and Is to be discharged with Home health        Lung mass   Assessment & Plan    -Previous imaging studies showed 5 x 11.7 mm spiculated nodule right lung apex similar to findings in 3/26/2015  -Scattered atelectasis/fibrosis and mild mediastinal,hilar adenopathy.  -Patient refused any interventions of the mass. Discussed with guardian about this, it has been known to her as well and does not want aggressive measure given overall prognosis with dementia.             Hyponatremia   Assessment & Plan    -On admission CMP showed Na of 127,Repeat after IVF is 134. Likely secondary to dehydration or due to known lung mass.  -History of Lung mass 5.3 mm spiculated mass in the right apex   -continue to monitor as outpatient.            Chest pain   Assessment & Plan    -Patient has symptoms of chest pain located on the right side   -Ribs xr showed possible subtle anterior lateral ninth rib fracture   Plan:   - home Percocet bid. Hold further dose increase due to increase risk for fall. PRN tylenol for pain if needed. Patient is agreeable with the plan.        Leukocytosis   Assessment & Plan    -WBC on admission is 15.4, differential abnormal with smudge cells and basophils, PS suggest large platelets. There's mild anemia appropriate for his age, no thrombocytopenia. 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   - Given patient's overall prognosis, wish for non aggressive measure and indolent nature of CLL, no further evaluation recommended as this time.   - monitor with outpatient CBC and referral to hematology if he develops  significant thrombocytopenia, symptomatic anemia. Rediscuss with guardian for goals of care.            Consultants:     Geriatrics (Dr Walters)  Physical therapy  Occupational thearpy    Procedures:        none    Imaging/ Testing:      ECHOCARDIOGRAM COMP W/O CONT   Final Result      DX-CHEST-LIMITED (1 VIEW)   Final Result         1.  No acute cardiopulmonary disease.   2.  Atherosclerosis      [id number] (WITH 1-VIEW CXR) RIGHT   Final Result         1.  Possible subtle anterior lateral ninth rib fracture.   2.  Atherosclerosis      CT-HEAD W/O   Final Result         1.  No acute intracranial abnormality is identified, there are changes of small vessel ischemic disease with mild atrophy noted.   2.  Atherosclerosis.            Discharge Medications:         Medication Reconciliation: Completed       Medication List      START taking these medications      Instructions   acetaminophen 325 MG Tabs  Commonly known as:  TYLENOL   Take 2 Tabs by mouth every 8 hours as needed (Mild Pain; (Pain scale 1-3); Temp greater than 100.5 F).  Dose:  650 mg     vitamin D (Ergocalciferol) 10656 units Caps capsule  Start taking on:  2/14/2018  Commonly known as:  DRISDOL   Take 1 Cap by mouth every 7 days.  Dose:  71448 Units        CHANGE how you take these medications      Instructions   donepezil 5 MG Tabs  What changed:  · medication strength  · how much to take  · when to take this  Commonly known as:  ARICEPT   Take 1 Tab by mouth every day.  Dose:  5 mg        CONTINUE taking these medications      Instructions   cyanocobalamin 100 MCG Tabs  Commonly known as:  VITAMIN B-12   Take 100 mcg by mouth every day.  Dose:  100 mcg     docusate sodium 100 MG Caps  Commonly known as:  COLACE   Take 200 mg by mouth every day.  Dose:  200 mg     erythromycin 5 MG/GM Oint   Place  in both eyes every evening.     gabapentin 300 MG Caps  Commonly known as:  NEURONTIN   Take 900 mg by mouth 3 times a day.  Dose:  900 mg      mirtazapine 15 MG Tabs  Commonly known as:  REMERON   Take 15 mg by mouth every evening.  Dose:  15 mg     oxyCODONE-acetaminophen  MG Tabs  Commonly known as:  PERCOCET-10   Take 1 Tab by mouth 2 Times a Day.  Dose:  1 Tab     PARoxetine 20 MG Tabs  Commonly known as:  PAXIL   Take 20 mg by mouth every day.  Dose:  20 mg     tiotropium 18 MCG Caps  Commonly known as:  SPIRIVA   Inhale 18 mcg by mouth every day.  Dose:  18 mcg        STOP taking these medications    risperiDONE 0.5 MG Tabs  Commonly known as:  RISPERDAL            Disposition:   Back to assisted living facility    Diet:   regular    Activity:   As tolerated , use front wheel walker when ambulating.    Instructions:        The patient was instructed to return to the ER in the event of worsening symptoms. I have counseled the patient on the importance of compliance and the patient has agreed to proceed with all medical recommendations and follow up plan indicated above.   The patient understands that all medications come with benefits and risks. Risks may include permanent injury or death and these risks can be minimized with close reassessment and monitoring.        Primary Care Provider:    SHERLEY Santos    Discharge summary faxed to primary care provider:  Completed  Copy of discharge summary given to the patient: Completed      Follow up appointment details :      Called hospital  to arrange appointment with PCP within a week, not available at the time of finishing discharge summary. Patient / caregiver should receive a call for confirmation of appointment.    Pending Studies:        none    Time spent on discharge day patient visit, preparing discharge paperwork and arranging for patient follow up.    Summary of follow up issues:   - wean gabapentin, antidepressant and antipsychotic medications as tolerated  - monitor CBC regularly for worsening anemia and thrombocytopenia    Discharge Time (Minutes) :    60  minutes  Hospital Course Type:  Inpatient Stay >2 midnights      Condition on Discharge    ______________________________________________________________________    Interval history/exam for day of discharge:      Patient said he's feeling great. Ready to go back to his facility. Denies any compliant apart from willingness to go home    Vitals:    02/08/18 0200 02/08/18 0535 02/08/18 0835 02/08/18 1254   BP: 115/67 118/63 132/86 108/65   Pulse: (!) 113 75 98 (!) 128   Resp: 18 18 18 20   Temp: 37.6 °C (99.7 °F) 37.7 °C (99.8 °F) 37.2 °C (99 °F) 37.1 °C (98.7 °F)   SpO2: 93% 94% 94% 93%   Weight:       Height:         Weight/BMI: Body mass index is 17.16 kg/m².  Pulse Oximetry: 92 %, O2 (LPM): 0, O2 Delivery: None (Room Air)     General: NAD  CVS: S1, S2, AS murmur  PULM: CTA bilaterally, tender on right lower rib  ABD: soft, non tender, BS +  EXT: no edema    Most Recent Labs:    Lab Results   Component Value Date/Time    WBC 15.9 (H) 02/06/2018 02:57 AM    RBC 3.93 (L) 02/06/2018 02:57 AM    HEMOGLOBIN 12.6 (L) 02/06/2018 02:57 AM    HEMATOCRIT 36.2 (L) 02/06/2018 02:57 AM    MCV 92.1 02/06/2018 02:57 AM    MCH 32.1 02/06/2018 02:57 AM    MCHC 34.8 02/06/2018 02:57 AM    MPV 8.9 (L) 02/06/2018 02:57 AM    NEUTSPOLYS 48.70 02/06/2018 02:57 AM    LYMPHOCYTES 44.30 (H) 02/06/2018 02:57 AM    MONOCYTES 1.70 02/06/2018 02:57 AM    EOSINOPHILS 0.00 02/06/2018 02:57 AM    BASOPHILS 0.90 02/06/2018 02:57 AM    HYPOCHROMIA 1+ 10/14/2013 05:27 AM    ANISOCYTOSIS 1+ 02/06/2018 02:57 AM      Lab Results   Component Value Date/Time    SODIUM 127 (L) 02/06/2018 02:57 AM    POTASSIUM 3.6 02/06/2018 02:57 AM    CHLORIDE 98 02/06/2018 02:57 AM    CO2 23 02/06/2018 02:57 AM    GLUCOSE 89 02/06/2018 02:57 AM    BUN 5 (L) 02/06/2018 02:57 AM    CREATININE 0.64 02/06/2018 02:57 AM      Lab Results   Component Value Date/Time    ALTSGPT 21 02/05/2018 03:27 AM    ASTSGOT 27 02/05/2018 03:27 AM    ALKPHOSPHAT 60 02/05/2018 03:27 AM     TBILIRUBIN 0.5 02/05/2018 03:27 AM    ALBUMIN 3.3 02/05/2018 03:27 AM    GLOBULIN 1.6 (L) 02/05/2018 03:27 AM    INR 1.12 02/04/2018 07:51 PM    MACROCYTOSIS 1+ 02/06/2018 02:57 AM     Lab Results   Component Value Date/Time    PROTHROMBTM 14.1 02/04/2018 07:51 PM    INR 1.12 02/04/2018 07:51 PM

## 2018-02-08 NOTE — DISCHARGE PLANNING
[facility] still pending; ANTONY notified [facility].    ANTONY spoke with Sherry, pt's guardian. Sherry gave verbal consent for pt to d/c home if [facility] approves; pt's physical address is: [address] [address] ([phone]; likely will be forwarded to [place]  - [place] or [place]). Per Sherry, [facility] may be able to  pt. Sherry requested X-ray notes, D/C summary, and MAR to be faxed to her for her records: [phone].     ANTONY spoke with ANTONY Supervisor Joseph; Joseph confirmed ANTONY can send above to Sherry.

## 2018-02-08 NOTE — PROGRESS NOTES
Internal Medicine Interval Note  Note Author: ZAINAB Hong.     Name Jose Armando Wu     1945   Age/Sex 72 y.o. male   MRN [id number]   Code Status Full     After 5PM or if no immediate response to page, please call for cross-coverage  Attending/Team: aiden/ See Patient List for primary contact information  Call [phone] to page    1st Call - Day Intern (R1):   Sarah Vazquez   2nd Call - Day Sr. Resident (R2/R3):            Reason for interval visit  (Principal Problem)   Fall from ground level    Interval Problem Daily Status Update  (24 hours)   -Patient is stable last night.  -Denies any acute complaints.  -Is eager to go home.  -Home health orders are placed.Waiting for the  for the order request. Spoke to the guardian yesterday regarding the disposition plan and follow up plans for lung caner.patient is not interested in any further interventions per the guardian.    Review of Systems   Constitutional: Negative for fever and malaise/fatigue.   HENT: Negative for congestion and sore throat.    Eyes: Negative for blurred vision.   Respiratory: Negative for cough, shortness of breath and wheezing.    Cardiovascular: Positive for chest pain.   Gastrointestinal: Negative for abdominal pain, constipation, diarrhea, heartburn, nausea and vomiting.   Genitourinary: Negative for dysuria.   Musculoskeletal: Positive for joint pain.   Skin: Negative for rash.   Neurological: Positive for weakness. Negative for dizziness, speech change and focal weakness.   Psychiatric/Behavioral: The patient is not nervous/anxious.        Consultants/Specialty  geriatrics    Disposition  Discharge home with home health after the home health orders are accepted.    Quality Measures    Reviewed items::  EKG reviewed, Radiology images reviewed, Labs reviewed and Medications reviewed  DVT prophylaxis pharmacological::  Enoxaparin (Lovenox)  Ulcer Prophylaxis::  Yes          Physical  Exam       Vitals:    02/07/18 0318 02/07/18 0802 02/07/18 1247 02/07/18 1547   BP: 119/80 117/84 113/90 126/92   Pulse: (!) 106 (!) 107 82 74   Resp: 18 16 18 16   Temp: 37.2 °C (99 °F) 36.6 °C (97.8 °F) 36.6 °C (97.9 °F) 37.2 °C (99 °F)   SpO2: 93% 92% 92% 91%   Weight:       Height:         Body mass index is 17.16 kg/m².    Oxygen Therapy:  Pulse Oximetry: 91 %, O2 (LPM): 0, O2 Delivery: None (Room Air)    Physical Exam   Constitutional: He is oriented to person, place, and time. No distress.   HENT:   Mouth/Throat: Oropharynx is clear and moist.   Eyes: Pupils are equal, round, and reactive to light.   Neck: Normal range of motion. Neck supple.   Cardiovascular: Normal rate and regular rhythm.    Pulmonary/Chest: Effort normal and breath sounds normal. He has no wheezes. He exhibits tenderness.   Abdominal: Soft. Bowel sounds are normal.   Musculoskeletal: Normal range of motion.   Lymphadenopathy:     He has no cervical adenopathy.   Neurological: He is alert and oriented to person, place, and time. No cranial nerve deficit.   Skin: Skin is warm and dry. He is not diaphoretic.   Nursing note and vitals reviewed.        Lab Data Review:         2/7/2018  7:01 PM    Recent Labs      02/04/18 1951 02/05/18 0327 02/06/18   0257   SODIUM  127*  134*  127*   POTASSIUM  3.7  3.9  3.6   CHLORIDE  99  108  98   CO2  23  22  23   BUN  7*  7*  5*   CREATININE  0.72  0.56  0.64   CALCIUM  8.0*  8.3*  7.8*       Recent Labs      02/04/18 1951 02/05/18 0327 02/06/18   0257   ALTSGPT  27  21   --    ASTSGOT  36  27   --    ALKPHOSPHAT  68  60   --    TBILIRUBIN  0.5  0.5   --    GLUCOSE  82  100*  89       Recent Labs      02/04/18 1951 02/05/18 0327 02/06/18   0257   RBC  3.85*  3.79*  3.93*   HEMOGLOBIN  12.4*  12.1*  12.6*   HEMATOCRIT  36.5*  36.0*  36.2*   PLATELETCT  213  216  230   PROTHROMBTM  14.1   --    --    APTT  28.1   --    --    INR  1.12   --    --        Recent Labs      02/04/18 1951   02/05/18   0327  02/06/18   0257   WBC  15.4*  12.4*  15.9*   NEUTSPOLYS  64.00  39.10*  48.70   LYMPHOCYTES  31.60  56.50*  44.30*   MONOCYTES  3.50  2.60  1.70   EOSINOPHILS  0.00  0.00  0.00   BASOPHILS  0.00  0.00  0.90   ASTSGOT  36  27   --    ALTSGPT  27  21   --    ALKPHOSPHAT  68  60   --    TBILIRUBIN  0.5  0.5   --            Assessment/Plan     * Fall from ground level- (present on admission)   Assessment & Plan    -72 years old male presented after a unwitnessed GLF on admission with symptoms of dizziness,lightheadedness prior to the fall. Denies any prodromal symptoms of diaphoresis,palpitations, SOB or chest pain.  -Patient ambulates with a cane when required to use a walker per guardian.  - telemetry showed SR-ST up to 100s.  -ECHO showed EF 55%, mild AS unchanged from 2016.  -Differential diagnosis : Polypharmacy, Orthostatic hypotension  -Consulted geriatrics.Appreciate Rec's. Recommended to taper his donepezil, anitpsychotics and SSRIs gradually as tolerated.                    Lung mass- (present on admission)   Assessment & Plan    -Previous imaging studies showed 5 x 11.7 mm spiculated nodule right lung apex similar to findings in 3/26/2015  -Scattered atelectasis/fibrosis and mild mediastinal,hilar adenopathy.  -Patient refused any interventions of the mass. Discussed with guardian about this, it has been known to her as well and does not want aggressive measure given overall prognosis with dementia.             Hyponatremia- (present on admission)   Assessment & Plan    -On admission CMP showed Na of 127,Repeat after IVF is 134. Likely secondary to dehydration or due to known lung mass.  -History of Lung mass 5.3 mm spiculated mass in the right apex   -continue to monitor as outpatient.            Chest pain- (present on admission)   Assessment & Plan    -Patient has symptoms of chest pain located on the right side   -Ribs xr showed possible subtle anterior lateral ninth rib fracture   Plan:    - home Percocet bid. Hold further dose increase due to increase risk for fall. PRN tylenol for pain if needed. Patient is agreeable with the plan.        Leukocytosis- (present on admission)   Assessment & Plan    -WBC on admission is 15.4, differential abnormal with smudge cells and basophils, PS suggest large platelets. There's mild anemia appropriate for his age, no thrombocytopenia. 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   - Given patient's overall prognosis, wish for non aggressive measure and indolent nature of CLL, no further evaluation recommended as this time.   - monitor with outpatient CBC and referral to hematology if he develops significant thrombocytopenia, symptomatic anemia. Rediscuss with guardian for goals of care.

## 2018-02-08 NOTE — DISCHARGE PLANNING
Received HH choice from [facility]) at 1553..  Home Health referral sent to [facility] at [phone].

## 2018-02-08 NOTE — DISCHARGE PLANNING
Medical Social Work    SW left VM for pt's public guardian, Sherry, to get consent to d/c back to group home.

## 2018-02-08 NOTE — PROGRESS NOTES
Assumed care at 0700, bedside report received from night shift RN. Patient is AOx4 with no signs of distress. Pt. Is  on the monitor. Initial assessment completed, orders reviewed, call light within reach, bed alarm in use, and hourly rounding in place. POC addressed with patient, no additional questions at this time.

## 2018-02-08 NOTE — CARE PLAN
Problem: Communication  Goal: The ability to communicate needs accurately and effectively will improve  Outcome: PROGRESSING AS EXPECTED  Pt. Is able to express comfort needs effectively. Pt. C/o pain 10/10 at all times despite medication or non-pharmacological intervention. He is able to vocalize wants and needs.     Problem: Venous Thromboembolism (VTW)/Deep Vein Thrombosis (DVT) Prevention:  Goal: Patient will participate in Venous Thrombosis (VTE)/Deep Vein Thrombosis (DVT)Prevention Measures  Outcome: PROGRESSING AS EXPECTED  Patient is being anticoagulated with lovenox. Pt. Is up x1 assist and walks occasionally.

## 2018-02-08 NOTE — PROGRESS NOTES
"Patient refused skin assessments yesterday from both this RN and Lynne, Wound RN. He allowed me to assess wounds/ skin today. Sacrum appears to be discolored and more red/ purple than admission photo. In report I was told he was \"bruised\" from his fall however I feel it may be more incontinence related or pressure related. I called wound team and spoke to Alyx. She said they will not have time to assess him today but since he is going home with home health they should be able to take care of it then. Charge RN notified.  "

## 2018-02-08 NOTE — CARE PLAN
Problem: Venous Thromboembolism (VTW)/Deep Vein Thrombosis (DVT) Prevention:  Goal: Patient will participate in Venous Thrombosis (VTE)/Deep Vein Thrombosis (DVT)Prevention Measures  Outcome: PROGRESSING AS EXPECTED  Patient is being anticoagulated with lovenox. Pt. Is up x1 assist and walking occasionally    Problem: Discharge Barriers/Planning  Goal: Patient's continuum of care needs will be met  Outcome: PROGRESSING AS EXPECTED  Discharge is pending for today. Working with social work and his guardian to get him home with home health.

## 2018-02-08 NOTE — PROGRESS NOTES
Report received. Care assumed. Patient A&Ox3. Pt reports feeling 10/10 pain, gave pt new heat pack, no SOB at this time.  Pt currently has no further needs.Discussed POC for the day with Pt. Call light within reach, encouraged pt to call for assistance.

## 2018-02-08 NOTE — DISCHARGE INSTRUCTIONS
Discharge Instructions    Discharged to group home by medical transportation with escort. Discharged via wheelchair, hospital escort: Yes.  Special equipment needed: Not Applicable    Be sure to schedule a follow-up appointment with your primary care doctor or any specialists as instructed.     Discharge Plan:   Diet Plan: Discussed  Activity Level: Discussed  Smoking Cessation Offered: Patient Counseled  Confirmed Follow up Appointment: Appointment Scheduled  Confirmed Symptoms Management: Discussed  Medication Reconciliation Updated: Yes  Influenza Vaccine Indication: Not indicated: Previously immunized this influenza season and > 8 years of age    I understand that a diet low in cholesterol, fat, and sodium is recommended for good health. Unless I have been given specific instructions below for another diet, I accept this instruction as my diet prescription.   Other diet: As tolerated     Special Instructions: None    · Is patient discharged on Warfarin / Coumadin?   No     Depression / Suicide Risk    As you are discharged from this [facility] facility, it is important to learn how to keep safe from harming yourself.    Recognize the warning signs:  · Abrupt changes in personality, positive or negative- including increase in energy   · Giving away possessions  · Change in eating patterns- significant weight changes-  positive or negative  · Change in sleeping patterns- unable to sleep or sleeping all the time   · Unwillingness or inability to communicate  · Depression  · Unusual sadness, discouragement and loneliness  · Talk of wanting to die  · Neglect of personal appearance   · Rebelliousness- reckless behavior  · Withdrawal from people/activities they love  · Confusion- inability to concentrate     If you or a loved one observes any of these behaviors or has concerns about self-harm, here's what you can do:  · Talk about it- your feelings and reasons for harming yourself  · Remove any means that you might  use to hurt yourself (examples: pills, rope, extension cords, firearm)  · Get professional help from the community (Mental Health, Substance Abuse, psychological counseling)  · Do not be alone:Call your Safe Contact- someone whom you trust who will be there for you.  · Call your local CRISIS HOTLINE 319-7798 or 872-611-7776  · Call your local Children's Mobile Crisis Response Team [place] (356) 297-5007 or [employer]  · Call the toll free National Suicide Prevention Hotlines   · National Suicide Prevention Lifeline 509-556-NYBT (8781)  · National Hope Line Network 800-SUICIDE (893-0935)      Fall Prevention in the Home   Falls can cause injuries. They can happen to people of all ages. There are many things you can do to make your home safe and to help prevent falls.   WHAT CAN I DO ON THE OUTSIDE OF MY HOME?  · Regularly fix the edges of walkways and driveways and fix any cracks.  · Remove anything that might make you trip as you walk through a door, such as a raised step or threshold.  · Trim any bushes or trees on the path to your home.  · Use bright outdoor lighting.  · Clear any walking paths of anything that might make someone trip, such as rocks or tools.  · Regularly check to see if handrails are loose or broken. Make sure that both sides of any steps have handrails.  · Any raised decks and porches should have guardrails on the edges.  · Have any leaves, snow, or ice cleared regularly.  · Use sand or salt on walking paths during winter.  · Clean up any spills in your garage right away. This includes oil or grease spills.  WHAT CAN I DO IN THE BATHROOM?   · Use night lights.  · Install grab bars by the toilet and in the tub and shower. Do not use towel bars as grab bars.  · Use non-skid mats or decals in the tub or shower.  · If you need to sit down in the shower, use a plastic, non-slip stool.  · Keep the floor dry. Clean up any water that spills on the floor as soon as it happens.  · Remove  soap buildup in the tub or shower regularly.  · Attach bath mats securely with double-sided non-slip rug tape.  · Do not have throw rugs and other things on the floor that can make you trip.  WHAT CAN I DO IN THE BEDROOM?  · Use night lights.  · Make sure that you have a light by your bed that is easy to reach.  · Do not use any sheets or blankets that are too big for your bed. They should not hang down onto the floor.  · Have a firm chair that has side arms. You can use this for support while you get dressed.  · Do not have throw rugs and other things on the floor that can make you trip.  WHAT CAN I DO IN THE KITCHEN?  · Clean up any spills right away.  · Avoid walking on wet floors.  · Keep items that you use a lot in easy-to-reach places.  · If you need to reach something above you, use a strong step stool that has a grab bar.  · Keep electrical cords out of the way.  · Do not use floor polish or wax that makes floors slippery. If you must use wax, use non-skid floor wax.  · Do not have throw rugs and other things on the floor that can make you trip.  WHAT CAN I DO WITH MY STAIRS?  · Do not leave any items on the stairs.  · Make sure that there are handrails on both sides of the stairs and use them. Fix handrails that are broken or loose. Make sure that handrails are as long as the stairways.  · Check any carpeting to make sure that it is firmly attached to the stairs. Fix any carpet that is loose or worn.  · Avoid having throw rugs at the top or bottom of the stairs. If you do have throw rugs, attach them to the floor with carpet tape.  · Make sure that you have a light switch at the top of the stairs and the bottom of the stairs. If you do not have them, ask someone to add them for you.  WHAT ELSE CAN I DO TO HELP PREVENT FALLS?  · Wear shoes that:  ¨ Do not have high heels.  ¨ Have rubber bottoms.  ¨ Are comfortable and fit you well.  ¨ Are closed at the toe. Do not wear sandals.  · If you use a  stepladder:  ¨ Make sure that it is fully opened. Do not climb a closed stepladder.  ¨ Make sure that both sides of the stepladder are locked into place.  ¨ Ask someone to hold it for you, if possible.  · Clearly leny and make sure that you can see:  ¨ Any grab bars or handrails.  ¨ First and last steps.  ¨ Where the edge of each step is.  · Use tools that help you move around (mobility aids) if they are needed. These include:  ¨ Canes.  ¨ Walkers.  ¨ Scooters.  ¨ Crutches.  · Turn on the lights when you go into a dark area. Replace any light bulbs as soon as they burn out.  · Set up your furniture so you have a clear path. Avoid moving your furniture around.  · If any of your floors are uneven, fix them.  · If there are any pets around you, be aware of where they are.  · Review your medicines with your doctor. Some medicines can make you feel dizzy. This can increase your chance of falling.  Ask your doctor what other things that you can do to help prevent falls.     This information is not intended to replace advice given to you by your health care provider. Make sure you discuss any questions you have with your health care provider.     Document Released: 10/14/2010 Document Revised: 05/03/2016 Document Reviewed: 01/22/2016  [employer] Interactive Patient Education ©2016 [employer]      Skin Tear Care  A skin tear is when the top layer of skin peels off. To repair the skin, your doctor may use:   · Tape.  · Skin adhesive strips.  HOME CARE  · Change bandages (dressings) once a day or as told by your doctor.  ¨ Gently clean the area with salt (saline) solution or with a mild soap and water.  ¨ Do not rub the injured skin dry. Let the area air dry.  ¨ Put petroleum jelly or antibiotic cream on the tear. Do not allow a scab to form.  ¨ If the bandage sticks, moisten it with warm soapy water and remove it.  · Protect the injured skin until it has healed.  · Only take medicine as told by your doctor.  · Take showers  or baths using warm soapy water. Apply a new bandage after the shower or bath.  · Keep all doctor visits as told.  GET HELP RIGHT AWAY IF:   · You have redness, puffiness (swelling), or more pain in the tear.  · You have a yellowish-white fluid (pus) coming from the tear.  · You have chills.  · You have a red streak that goes away from the tear.  · You have a bad smell coming from the tear or bandage.  · You have a fever or lasting symptoms for more than 2-3 days.  · You have a fever and your symptoms suddenly get worse.  MAKE SURE YOU:   · Understand these instructions.  · Will watch this condition.  · Will get help right away if you are not doing well or get worse.     This information is not intended to replace advice given to you by your health care provider. Make sure you discuss any questions you have with your health care provider.     Document Released: 09/26/2009 Document Revised: 09/11/2013 Document Reviewed: 07/01/2013  [employer] Interactive Patient Education ©2016 [employer]      Nonspecific Tachycardia  Tachycardia is a faster than normal heartbeat (more than 100 beats per minute). In adults, the heart normally beats between 60 and 100 times a minute. A fast heartbeat may be a normal response to exercise or stress. It does not necessarily mean that something is wrong. However, sometimes when your heart beats too fast it may not be able to pump enough blood to the rest of your body. This can result in chest pain, shortness of breath, dizziness, and even fainting. Nonspecific tachycardia means that the specific cause or pattern of your tachycardia is unknown.  CAUSES   Tachycardia may be harmless or it may be due to a more serious underlying cause. Possible causes of tachycardia include:  · Exercise or exertion.  · Fever.  · Pain or injury.  · Infection.  · Loss of body fluids (dehydration).  · Overactive thyroid.  · Lack of red blood cells (anemia).  · Anxiety and  stress.  · Alcohol.  · Caffeine.  · Tobacco products.  · Diet pills.  · Illegal drugs.  · Heart disease.  SYMPTOMS  · Rapid or irregular heartbeat (palpitations).  · Suddenly feeling your heart beating (cardiac awareness).  · Dizziness.  · Tiredness (fatigue).  · Shortness of breath.  · Chest pain.  · Nausea.  · Fainting.  DIAGNOSIS   Your caregiver will perform a physical exam and take your medical history. In some cases, a heart specialist (cardiologist) may be consulted. Your caregiver may also order:  · Blood tests.  · Electrocardiography. This test records the electrical activity of your heart.  · A heart monitoring test.  TREATMENT   Treatment will depend on the likely cause of your tachycardia. The goal is to treat the underlying cause of your tachycardia. Treatment methods may include:  · Replacement of fluids or blood through an intravenous (IV) tube for moderate to severe dehydration or anemia.  · New medicines or changes in your current medicines.  · Diet and lifestyle changes.  · Treatment for certain infections.  · Stress relief or relaxation methods.  HOME CARE INSTRUCTIONS   · Rest.  · Drink enough fluids to keep your urine clear or pale yellow.  · Do not smoke.  · Avoid:  ¨ Caffeine.  ¨ Tobacco.  ¨ Alcohol.  ¨ Chocolate.  ¨ Stimulants such as over-the-counter diet pills or pills that help you stay awake.  ¨ Situations that cause anxiety or stress.  ¨ Illegal drugs such as marijuana, phencyclidine (PCP), and cocaine.  · Only take medicine as directed by your caregiver.  · Keep all follow-up appointments as directed by your caregiver.  SEEK IMMEDIATE MEDICAL CARE IF:   · You have pain in your chest, upper arms, jaw, or neck.  · You become weak, dizzy, or feel faint.  · You have palpitations that will not go away.  · You vomit, have diarrhea, or pass blood in your stool.  · Your skin is cool, pale, and wet.  · You have a fever that will not go away with rest, fluids, and medicine.  MAKE SURE YOU:    · Understand these instructions.  · Will watch your condition.  · Will get help right away if you are not doing well or get worse.     This information is not intended to replace advice given to you by your health care provider. Make sure you discuss any questions you have with your health care provider.     Document Released: 01/25/2006 Document Revised: 03/11/2013 Document Reviewed: 11/27/2012  [employer] Interactive Patient Education ©2016 [employer].    Ergocalciferol, Vitamin D2 tablets or capsules  What is this medicine?  ERGOCALCIFEROL (er goe martita SIF e role) is a man made form of vitamin D. It helps your body keep the right amount of calcium and phosphorus for healthy bones and teeth.  This medicine may be used for other purposes; ask your health care provider or pharmacist if you have questions.  COMMON BRAND NAME(S): Deltalin, Drisdol, Ergo D  What should I tell my health care provider before I take this medicine?  They need to know if you have any of the following conditions:  -kidney disease  -liver disease  -other chronic disease  -parathyroid disease  -stomach disease  -an unusual or allergic reaction to vitamin D, other medicines, foods, dyes, or preservatives  -pregnant or trying to get pregnant  -breast-feeding  How should I use this medicine?  Take this medicine by mouth with a glass of water. Follow the directions on the prescription label. Take your medicine at regular intervals. Do not take your medicine more often than directed.  Talk to your pediatrician regarding the use of this medicine in children. While this drug may be prescribed for children for selected conditions, precautions do apply.  Overdosage: If you think you have taken too much of this medicine contact a poison control center or emergency room at once.  NOTE: This medicine is only for you. Do not share this medicine with others.  What if I miss a dose?  If you miss a dose, take it as soon as you can. If it is almost time for  your next dose, take only that dose. Do not take double or extra doses.  What may interact with this medicine?  Do not take this medicine with any of the following medications:  -vitamin D  This medicine may also interact with the following medications:  -digoxin  -diuretics  -medicines for cholesterol like colestipol or cholestyramine  -medicines to treat seizures or nerve pain  -mineral oil  -orlistat  -some over-the-counter supplements  This list may not describe all possible interactions. Give your health care provider a list of all the medicines, herbs, non-prescription drugs, or dietary supplements you use. Also tell them if you smoke, drink alcohol, or use illegal drugs. Some items may interact with your medicine.  What should I watch for while using this medicine?  Visit your doctor or health care professional for regular checks on your progress.  Do not take any non-prescription medicines that have vitamin D, phosphorus, magnesium, or calcium including antacids while taking this medicine, unless your doctor or health care professional says you can. The extra supplements can cause side effects.  What side effects may I notice from receiving this medicine?  Side effects that you should report to your doctor or health care professional as soon as possible:  -allergic reactions like skin rash, itching or hives, swelling of the face, lips, or tongue  -bone pain  -increased thirst  -increased urination (especially at night)  -irregular heartbeat, high blood pressure  -seizures  -unexpected weight loss  -unusually weak or tired  Side effects that usually do not require medical attention (report to your doctor or health care professional if they continue or are bothersome):  -constipation  -dry mouth  -headache  -loss of appetite  -metallic taste  -stomach upset  This list may not describe all possible side effects. Call your doctor for medical advice about side effects. You may report side effects to FDA at  1-800-FDA-1088.  Where should I keep my medicine?  Keep out of the reach of children.  Store at room temperature between 15 and 30 degrees C (59 and 86 degrees F). Protect from light. Keep container tightly closed. Throw away any unused medicine after the expiration date.  NOTE: This sheet is a summary. It may not cover all possible information. If you have questions about this medicine, talk to your doctor, pharmacist, or health care provider.  © 2014, [employer]. (4/20/2009 5:48:44 PM)

## 2018-02-08 NOTE — DISCHARGE PLANNING
ANTONY spoke with Renetta with [facility]. Per Renetta, pt wheelchair bound and her car can't accommodate wheelchair.     ANTONY spoke with pt's RN. Per RN, pt can go by wheelchair van. ANTONY faxed transport communication form to [facility]. Per [facility], transport arranged 1630. ANTONY notified RN of transport time. ANTONY faxed X ray notes, MAR, and d/c summary to [facility], as she requested.    ANTONY left vm message for [facility] stating [facility] accepted, transport arranged at 1630 to [facility] and she faxed X ray notes, MAR, and d/c summary as requested.

## 2018-02-09 NOTE — PROGRESS NOTES
Pt. Was discharged to group home via [facility] with escort. Discharge paperwork was discussed with the patient. All personal belongs were accounted for and taken with the patient. LDA's were removed and the tele monitor was disconnected. Pt. prescriptions sent to pharmacy.

## 2018-05-14 ENCOUNTER — APPOINTMENT (OUTPATIENT)
Dept: RADIOLOGY | Facility: MEDICAL CENTER | Age: 73
End: 2018-05-14
Attending: SURGERY
Payer: MEDICARE

## 2018-05-14 ENCOUNTER — HOSPITAL ENCOUNTER (OUTPATIENT)
Dept: LAB | Facility: MEDICAL CENTER | Age: 73
End: 2018-05-14
Attending: SURGERY
Payer: MEDICARE

## 2018-05-14 PROCEDURE — 82565 ASSAY OF CREATININE: CPT

## 2018-05-14 PROCEDURE — 36415 COLL VENOUS BLD VENIPUNCTURE: CPT

## 2018-05-14 PROCEDURE — 84520 ASSAY OF UREA NITROGEN: CPT

## 2018-05-15 LAB
BUN SERPL-MCNC: 16 MG/DL (ref 8–22)
CREAT SERPL-MCNC: 0.88 MG/DL (ref 0.5–1.4)

## 2018-05-16 ENCOUNTER — HOSPITAL ENCOUNTER (OUTPATIENT)
Dept: RADIOLOGY | Facility: MEDICAL CENTER | Age: 73
End: 2018-05-16
Attending: SURGERY
Payer: MEDICARE

## 2018-05-16 DIAGNOSIS — D38.1 NEOPLASM OF UNCERTAIN BEHAVIOR OF TRACHEA, BRONCHUS, AND LUNG: ICD-10-CM

## 2018-05-16 PROCEDURE — 71260 CT THORAX DX C+: CPT

## 2018-05-16 PROCEDURE — 700117 HCHG RX CONTRAST REV CODE 255: Performed by: SURGERY

## 2018-05-16 RX ADMIN — IOHEXOL 75 ML: 350 INJECTION, SOLUTION INTRAVENOUS at 14:09

## 2019-08-06 ENCOUNTER — HOSPITAL ENCOUNTER (OUTPATIENT)
Dept: LAB | Facility: MEDICAL CENTER | Age: 74
End: 2019-08-06
Attending: PHYSICIAN ASSISTANT
Payer: MEDICARE

## 2019-08-06 LAB
ALBUMIN SERPL BCP-MCNC: 4 G/DL (ref 3.2–4.9)
ALBUMIN/GLOB SERPL: 1.7 G/DL
ALP SERPL-CCNC: 77 U/L (ref 30–99)
ALT SERPL-CCNC: 12 U/L (ref 2–50)
ANION GAP SERPL CALC-SCNC: 6 MMOL/L (ref 0–11.9)
AST SERPL-CCNC: 19 U/L (ref 12–45)
BASOPHILS # BLD AUTO: 0 % (ref 0–1.8)
BASOPHILS # BLD: 0 K/UL (ref 0–0.12)
BILIRUB SERPL-MCNC: 0.5 MG/DL (ref 0.1–1.5)
BUN SERPL-MCNC: 15 MG/DL (ref 8–22)
CALCIUM SERPL-MCNC: 8.7 MG/DL (ref 8.5–10.5)
CHLORIDE SERPL-SCNC: 99 MMOL/L (ref 96–112)
CO2 SERPL-SCNC: 27 MMOL/L (ref 20–33)
CREAT SERPL-MCNC: 0.92 MG/DL (ref 0.5–1.4)
EOSINOPHIL # BLD AUTO: 0 K/UL (ref 0–0.51)
EOSINOPHIL NFR BLD: 0 % (ref 0–6.9)
ERYTHROCYTE [DISTWIDTH] IN BLOOD BY AUTOMATED COUNT: 48.5 FL (ref 35.9–50)
GLOBULIN SER CALC-MCNC: 2.3 G/DL (ref 1.9–3.5)
GLUCOSE SERPL-MCNC: 90 MG/DL (ref 65–99)
HCT VFR BLD AUTO: 44.1 % (ref 42–52)
HGB BLD-MCNC: 13.9 G/DL (ref 14–18)
LYMPHOCYTES # BLD AUTO: 7.59 K/UL (ref 1–4.8)
LYMPHOCYTES NFR BLD: 52 % (ref 22–41)
MANUAL DIFF BLD: NORMAL
MCH RBC QN AUTO: 30.7 PG (ref 27–33)
MCHC RBC AUTO-ENTMCNC: 31.5 G/DL (ref 33.7–35.3)
MCV RBC AUTO: 97.4 FL (ref 81.4–97.8)
MONOCYTES # BLD AUTO: 0.45 K/UL (ref 0–0.85)
MONOCYTES NFR BLD AUTO: 3.1 % (ref 0–13.4)
MORPHOLOGY BLD-IMP: NORMAL
NEUTROPHILS # BLD AUTO: 6.56 K/UL (ref 1.82–7.42)
NEUTROPHILS NFR BLD: 44.9 % (ref 44–72)
NRBC # BLD AUTO: 0.02 K/UL
NRBC BLD-RTO: 0.1 /100 WBC
PLATELET # BLD AUTO: 232 K/UL (ref 164–446)
PLATELET BLD QL SMEAR: NORMAL
PMV BLD AUTO: 9.6 FL (ref 9–12.9)
POTASSIUM SERPL-SCNC: 4.3 MMOL/L (ref 3.6–5.5)
PROT SERPL-MCNC: 6.3 G/DL (ref 6–8.2)
RBC # BLD AUTO: 4.53 M/UL (ref 4.7–6.1)
RBC BLD AUTO: PRESENT
SMUDGE CELLS BLD QL SMEAR: NORMAL
SODIUM SERPL-SCNC: 132 MMOL/L (ref 135–145)
WBC # BLD AUTO: 14.6 K/UL (ref 4.8–10.8)

## 2019-08-06 PROCEDURE — 85007 BL SMEAR W/DIFF WBC COUNT: CPT

## 2019-08-06 PROCEDURE — 36415 COLL VENOUS BLD VENIPUNCTURE: CPT

## 2019-08-06 PROCEDURE — 85027 COMPLETE CBC AUTOMATED: CPT

## 2019-08-06 PROCEDURE — 80053 COMPREHEN METABOLIC PANEL: CPT

## 2021-01-15 DIAGNOSIS — Z23 NEED FOR VACCINATION: ICD-10-CM

## 2021-07-22 PROBLEM — J96.21 ACUTE ON CHRONIC RESPIRATORY FAILURE WITH HYPOXEMIA (HCC): Status: ACTIVE | Noted: 2021-07-22

## 2021-07-22 PROBLEM — J44.9 COPD (CHRONIC OBSTRUCTIVE PULMONARY DISEASE) (HCC): Status: ACTIVE | Noted: 2021-07-22

## 2021-07-22 PROBLEM — G89.29 CHRONIC PAIN: Status: ACTIVE | Noted: 2021-07-22

## 2021-09-10 PROBLEM — F17.200 SMOKING: Status: ACTIVE | Noted: 2021-09-10

## 2021-09-10 PROBLEM — G25.0 ESSENTIAL TREMOR: Status: ACTIVE | Noted: 2021-09-10

## 2022-04-06 PROBLEM — R62.7 FAILURE TO THRIVE IN ADULT: Status: ACTIVE | Noted: 2022-04-06

## 2022-04-06 PROBLEM — E87.70 FLUID OVERLOAD: Status: ACTIVE | Noted: 2022-04-06

## 2022-04-06 PROBLEM — F11.20 UNCOMPLICATED OPIOID DEPENDENCE (HCC): Status: ACTIVE | Noted: 2022-04-06
